# Patient Record
Sex: FEMALE | Race: ASIAN | NOT HISPANIC OR LATINO | Employment: FULL TIME | URBAN - METROPOLITAN AREA
[De-identification: names, ages, dates, MRNs, and addresses within clinical notes are randomized per-mention and may not be internally consistent; named-entity substitution may affect disease eponyms.]

---

## 2017-01-18 ENCOUNTER — TELEPHONE (OUTPATIENT)
Dept: FAMILY MEDICINE | Facility: CLINIC | Age: 52
End: 2017-01-18

## 2017-01-18 NOTE — TELEPHONE ENCOUNTER
Notes Recorded by Beatriz Lazo RN on 1/18/2017 at 10:10 AM  3rd request.  Beatriz Lazo  Pap Tracking RN    Notes Recorded by Beatriz Lazo RN on 1/11/2017 at 9:48 AM  2nd request.  Beatriz Lazo  Pap Tracking RN    Notes Recorded by Tiny Loco, RN on 1/2/2017 at 8:24 AM  Dr. Hobson    Please review and advise  51 yr old with hx of hysterectomy in 2008 (from surgical hx). Benign pathology. Cervix was also removed.    Current NIL pap/Neg HPV.  What you recommend for pap follow up for this patient?    thanks  Tiny Loco, RN, BSN  Pap Tracking

## 2017-01-19 PROBLEM — Z12.4 CERVICAL CANCER SCREENING: Status: ACTIVE | Noted: 2017-01-19

## 2017-04-11 ENCOUNTER — OFFICE VISIT (OUTPATIENT)
Dept: FAMILY MEDICINE | Facility: CLINIC | Age: 52
End: 2017-04-11
Payer: COMMERCIAL

## 2017-04-11 VITALS
SYSTOLIC BLOOD PRESSURE: 108 MMHG | DIASTOLIC BLOOD PRESSURE: 64 MMHG | HEART RATE: 72 BPM | HEIGHT: 63 IN | WEIGHT: 139 LBS | TEMPERATURE: 97.5 F | BODY MASS INDEX: 24.63 KG/M2 | OXYGEN SATURATION: 97 % | RESPIRATION RATE: 14 BRPM

## 2017-04-11 DIAGNOSIS — R10.9 FLANK PAIN: ICD-10-CM

## 2017-04-11 DIAGNOSIS — N30.00 ACUTE CYSTITIS WITHOUT HEMATURIA: Primary | ICD-10-CM

## 2017-04-11 LAB
ALBUMIN UR-MCNC: NEGATIVE MG/DL
APPEARANCE UR: CLEAR
BILIRUB UR QL STRIP: NEGATIVE
COLOR UR AUTO: YELLOW
GLUCOSE UR STRIP-MCNC: NEGATIVE MG/DL
HGB UR QL STRIP: ABNORMAL
KETONES UR STRIP-MCNC: NEGATIVE MG/DL
LEUKOCYTE ESTERASE UR QL STRIP: NEGATIVE
NITRATE UR QL: NEGATIVE
NON-SQ EPI CELLS #/AREA URNS LPF: NORMAL /LPF
PH UR STRIP: 7 PH (ref 5–7)
RBC #/AREA URNS AUTO: NORMAL /HPF (ref 0–2)
SP GR UR STRIP: 1.01 (ref 1–1.03)
URN SPEC COLLECT METH UR: ABNORMAL
UROBILINOGEN UR STRIP-ACNC: 0.2 EU/DL (ref 0.2–1)
WBC #/AREA URNS AUTO: NORMAL /HPF (ref 0–2)

## 2017-04-11 PROCEDURE — 81001 URINALYSIS AUTO W/SCOPE: CPT | Performed by: FAMILY MEDICINE

## 2017-04-11 PROCEDURE — 99214 OFFICE O/P EST MOD 30 MIN: CPT | Performed by: FAMILY MEDICINE

## 2017-04-11 RX ORDER — CIPROFLOXACIN 250 MG/1
250 TABLET, FILM COATED ORAL 2 TIMES DAILY
Qty: 6 TABLET | Refills: 0 | Status: SHIPPED | OUTPATIENT
Start: 2017-04-11 | End: 2017-04-13

## 2017-04-11 NOTE — MR AVS SNAPSHOT
After Visit Summary   4/11/2017    Leo Chou    MRN: [id number]           Patient Information     Date Of Birth          1965        Visit Information        Provider Department      4/11/2017 11:20 AM Agata Hobson MD [facility]        Today's Diagnoses     Acute cystitis without hematuria    -  1    Flank pain-LT           Care Instructions    1.  You have  a bladder infection Please increase your water intake  Do not drink cranberry juice as this does nothing different from water except cause weight gain.   We  Will notify you re the results of today's urine culture  You may need to return to be sure the infection is gone.  Oftentimes sexual intercourse causes the bacteria from the rectum to go into the bladder and cause infection.  To avoid this, please get up and go to the bathroom after intercourse and drink a large glass of water.  This allows the urine to wash out the infective bacteria and thus speed healing of an infection, and prevent one from occurring.  You may buy pyridium over the counter to help with the burning  It is a purple pill that turns the urine orange and helps with the burning           Follow-ups after your visit        Follow-up notes from your care team     Return in about 3 months (around 7/11/2017) for Routine Visit.      Who to contact     If you have questions or need follow up information about today's clinic visit or your schedule please contact [facility] directly at [phone].  Normal or non-critical lab and imaging results will be communicated to you by MyChart, letter or phone within 4 business days after the clinic has received the results. If you do not hear from us within 7 days, please contact the clinic through MyChart or phone. If you have a critical or abnormal lab result, we will notify you by phone as soon as possible.  Submit refill requests through [email or web address] or  "call your pharmacy and they will forward the refill request to us. Please allow 3 business days for your refill to be completed.          Additional Information About Your Visit        MyChart Information     [employer] [email or web address] lets you send messages to your doctor, view your test results, renew your prescriptions, schedule appointments and more. To sign up, go to [email or web address] . Click on \"Log in\" on the left side of the screen, which will take you to the Welcome page. Then click on \"Sign up Now\" on the right side of the page.     You will be asked to enter the access code listed below, as well as some personal information. Please follow the directions to create your username and password.     Your access code is: [email or web address]  Expires: 7/10/2017 12:01 PM     Your access code will  in 90 days. If you need help or a new code, please call your [place] clinic or [phone].        Care EveryWhere ID     This is your Care EveryWhere ID. This could be used by other organizations to access your [place] medical records  [id number]        Your Vitals Were     Pulse Temperature Respirations Height Last Period Pulse Oximetry    72 97.5  F (36.4  C) (Tympanic) 14 5' 2.5\" (1.588 m) (LMP Unknown) 97%    Breastfeeding? BMI (Body Mass Index)                No 25.02 kg/m2           Blood Pressure from Last 3 Encounters:   17 108/64   16 100/60   16 120/70    Weight from Last 3 Encounters:   17 139 lb (63 kg)   16 136 lb (61.7 kg)   16 133 lb (60.3 kg)              We Performed the Following     UA reflex to Microscopic and Culture     Urine Microscopic          Today's Medication Changes          These changes are accurate as of: 17 12:01 PM.  If you have any questions, ask your nurse or doctor.               Start taking these medicines.        Dose/Directions    ciprofloxacin 250 MG tablet   Commonly known as:  CIPRO   Used for:  Acute cystitis without hematuria, Flank pain "   Started by:  Agata Hobson MD        Dose:  250 mg   Take 1 tablet (250 mg) by mouth 2 times daily   Quantity:  6 tablet   Refills:  0            Where to get your medicines      These medications were sent to [place] [address] [facility] - [place], [place] - [address]  [address] [facility], [facility] [address]     Phone:  [phone]     ciprofloxacin 250 MG tablet                Primary Care Provider Office Phone # Fax #    Agata Hobson [phone]       [facility] [address]  [facility] [address]        Thank you!     Thank you for choosing [facility]  for your care. Our goal is always to provide you with excellent care. Hearing back from our patients is one way we can continue to improve our services. Please take a few minutes to complete the written survey that you may receive in the mail after your visit with us. Thank you!             Your Updated Medication List - Protect others around you: Learn how to safely use, store and throw away your medicines at www.disposemymeds.org.          This list is accurate as of: 4/11/17 12:01 PM.  Always use your most recent med list.                   Brand Name Dispense Instructions for use    aspirin 81 MG tablet     90 tablet    Take 1 tablet (81 mg) by mouth daily       ciprofloxacin 250 MG tablet    CIPRO    6 tablet    Take 1 tablet (250 mg) by mouth 2 times daily       metroNIDAZOLE 500 MG tablet    FLAGYL    28 tablet    Take 1 tablet (500 mg) by mouth 2 times daily       ONE-A-DAY 50 PLUS Tabs      Take 1 tablet by mouth daily.

## 2017-04-11 NOTE — PROGRESS NOTES
SUBJECTIVE:                                                    Leo Chou is a 51 year old female who presents to clinic today for the following health issues:    URINARY TRACT SYMPTOMS      Duration: x 1 month    Similar but not as painful in 6-16 and 12-16     Had only trace of RBCs then also as now     Description  dysuria, frequency, retention and back pain-Lt CVA    Intensity:  moderate    Accompanying signs and symptoms:  Fever/chills: no   Flank pain YES on Lt   Nausea and vomiting: YES  Vaginal symptoms: none  Abdominal/Pelvic Pain: YES    History  History of frequent UTI's: no   History of kidney stones: no   Sexually Active: YES  Possibility of pregnancy: No    Precipitating or alleviating factors: None    Therapies tried and outcome: none   Outcome: NA         Problem list and histories reviewed & adjusted, as indicated.  Additional history: as documented    Labs reviewed in EPIC    Reviewed and updated as needed this visit by clinical staff  Allergies  Meds  Problems       Reviewed and updated as needed this visit by Provider         ROS:  C: NEGATIVE for fever, chills, change in weight  I: NEGATIVE for worrisome rashes, moles or lesions  E: NEGATIVE for vision changes or irritation  E/M: NEGATIVE for ear, mouth and throat problems  R: NEGATIVE for significant cough or SOB  B: NEGATIVE for masses, tenderness or discharge  CV: NEGATIVE for chest pain, palpitations or peripheral edema  GI: NEGATIVE for nausea, abdominal pain, heartburn, or change in bowel habits   female: dysuria, frequency, hesitancy and retention  M: NEGATIVE for significant arthralgias or myalgia  N: NEGATIVE for weakness, dizziness or paresthesias  E: NEGATIVE for temperature intolerance, skin/hair changes  H: NEGATIVE for bleeding problems  P: NEGATIVE for changes in mood or affect    OBJECTIVE:                                                    /64 (BP Location: Left arm, Patient Position: Chair, Cuff Size: Adult  "Regular)  Pulse 72  Temp 97.5  F (36.4  C) (Tympanic)  Resp 14  Ht 5' 2.5\" (1.588 m)  Wt 139 lb (63 kg)  LMP  (LMP Unknown)  SpO2 97%  Breastfeeding? No  BMI 25.02 kg/m2  Body mass index is 25.02 kg/(m^2).  GENERAL: healthy, alert, in pain from Lt flank =  distress and fatigued  EYES: Eyes grossly normal to inspection, PERRL and conjunctivae and sclerae normal  RESP: lungs clear to auscultation - no rales, rhonchi or wheezes  ABDOMEN: soft, nontender, no hepatosplenomegaly, no masses and bowel sounds normal; tender suprapubic and LT CVA   MS: no gross musculoskeletal defects noted, no edema  SKIN: no suspicious lesions or rashes  NEURO: Normal strength and tone, mentation intact and speech normal  PSYCH: mentation appears normal, affect normal/bright    Diagnostic Test Results:  Results for orders placed or performed in visit on 04/11/17 (from the past 24 hour(s))   UA reflex to Microscopic and Culture   Result Value Ref Range    Color Urine Yellow     Appearance Urine Clear     Glucose Urine Negative NEG mg/dL    Bilirubin Urine Negative NEG    Ketones Urine Negative NEG mg/dL    Specific Gravity Urine 1.015 1.003 - 1.035    Blood Urine Trace (A) NEG    pH Urine 7.0 5.0 - 7.0 pH    Protein Albumin Urine Negative NEG mg/dL    Urobilinogen Urine 0.2 0.2 - 1.0 EU/dL    Nitrite Urine Negative NEG    Leukocyte Esterase Urine Negative NEG    Source Midstream Urine    Urine Microscopic   Result Value Ref Range    WBC Urine O - 2 0 - 2 /HPF    RBC Urine O - 2 0 - 2 /HPF    Squamous Epithelial /LPF Urine Few FEW /LPF        ASSESSMENT/PLAN:                                                              ICD-10-CM    1. Acute cystitis without hematuria N30.00 ciprofloxacin (CIPRO) 250 MG tablet   2. Flank pain-LT  R10.9 ciprofloxacin (CIPRO) 250 MG tablet       Patient Instructions   1.  You have  a bladder infection Please increase your water intake  Do not drink cranberry juice as this does nothing different from " water except cause weight gain.   We  Will notify you re the results of today's urine culture  You may need to return to be sure the infection is gone.  Oftentimes sexual intercourse causes the bacteria from the rectum to go into the bladder and cause infection.  To avoid this, please get up and go to the bathroom after intercourse and drink a large glass of water.  This allows the urine to wash out the infective bacteria and thus speed healing of an infection, and prevent one from occurring.  You may buy pyridium over the counter to help with the burning  It is a purple pill that turns the urine orange and helps with the burning         Agata Hobson MD  [facility]

## 2017-04-11 NOTE — PATIENT INSTRUCTIONS
1.  You have  a bladder infection Please increase your water intake  Do not drink cranberry juice as this does nothing different from water except cause weight gain.   We  Will notify you re the results of today's urine culture  You may need to return to be sure the infection is gone.  Oftentimes sexual intercourse causes the bacteria from the rectum to go into the bladder and cause infection.  To avoid this, please get up and go to the bathroom after intercourse and drink a large glass of water.  This allows the urine to wash out the infective bacteria and thus speed healing of an infection, and prevent one from occurring.  You may buy pyridium over the counter to help with the burning  It is a purple pill that turns the urine orange and helps with the burning

## 2017-04-11 NOTE — NURSING NOTE
"Chief Complaint   Patient presents with     UTI     /64 (BP Location: Left arm, Patient Position: Chair, Cuff Size: Adult Regular)  Pulse 72  Temp 97.5  F (36.4  C) (Tympanic)  Resp 14  Ht 5' 2.5\" (1.588 m)  Wt 139 lb (63 kg)  LMP  (LMP Unknown)  SpO2 97%  Breastfeeding? No  BMI 25.02 kg/m2 Estimated body mass index is 25.02 kg/(m^2) as calculated from the following:    Height as of this encounter: 5' 2.5\" (1.588 m).    Weight as of this encounter: 139 lb (63 kg).  BP completed using cuff size: regular   Marii Mccarthy CMA    There are no preventive care reminders to display for this patient.  Health Maintenance reviewed at today's visit patient asked to schedule/complete:   None, Health Maintenance up to date.    "

## 2017-04-13 ENCOUNTER — OFFICE VISIT (OUTPATIENT)
Dept: FAMILY MEDICINE | Facility: CLINIC | Age: 52
End: 2017-04-13
Payer: COMMERCIAL

## 2017-04-13 VITALS
BODY MASS INDEX: 24.8 KG/M2 | TEMPERATURE: 96.1 F | WEIGHT: 140 LBS | OXYGEN SATURATION: 99 % | DIASTOLIC BLOOD PRESSURE: 70 MMHG | HEART RATE: 85 BPM | RESPIRATION RATE: 18 BRPM | HEIGHT: 63 IN | SYSTOLIC BLOOD PRESSURE: 100 MMHG

## 2017-04-13 DIAGNOSIS — K21.9 GASTROESOPHAGEAL REFLUX DISEASE WITHOUT ESOPHAGITIS: Primary | ICD-10-CM

## 2017-04-13 DIAGNOSIS — K29.01 OTHER ACUTE GASTRITIS WITH HEMORRHAGE: ICD-10-CM

## 2017-04-13 LAB
ALBUMIN UR-MCNC: NEGATIVE MG/DL
APPEARANCE UR: CLEAR
BILIRUB UR QL STRIP: NEGATIVE
COLOR UR AUTO: YELLOW
GLUCOSE UR STRIP-MCNC: NEGATIVE MG/DL
HGB UR QL STRIP: ABNORMAL
KETONES UR STRIP-MCNC: NEGATIVE MG/DL
LEUKOCYTE ESTERASE UR QL STRIP: NEGATIVE
NITRATE UR QL: NEGATIVE
PH UR STRIP: 5.5 PH (ref 5–7)
RBC #/AREA URNS AUTO: NORMAL /HPF (ref 0–2)
SP GR UR STRIP: <=1.005 (ref 1–1.03)
URN SPEC COLLECT METH UR: ABNORMAL
UROBILINOGEN UR STRIP-ACNC: 0.2 EU/DL (ref 0.2–1)
WBC #/AREA URNS AUTO: NORMAL /HPF (ref 0–2)

## 2017-04-13 PROCEDURE — 99214 OFFICE O/P EST MOD 30 MIN: CPT | Performed by: FAMILY MEDICINE

## 2017-04-13 PROCEDURE — 81001 URINALYSIS AUTO W/SCOPE: CPT | Performed by: FAMILY MEDICINE

## 2017-04-13 RX ORDER — NAPROXEN 500 MG/1
500 TABLET ORAL 2 TIMES DAILY PRN
Qty: 30 TABLET | Refills: 1 | COMMUNITY
Start: 2016-12-13 | End: 2017-04-13 | Stop reason: SINTOL

## 2017-04-13 RX ORDER — NICOTINE POLACRILEX 4 MG/1
20 GUM, CHEWING ORAL DAILY
Qty: 30 TABLET | Refills: 0 | Status: SHIPPED | OUTPATIENT
Start: 2017-04-13 | End: 2017-05-05

## 2017-04-13 NOTE — PROGRESS NOTES
SUBJECTIVE:                                                    Leo Chou is a 51 year old female who presents to clinic today for the following health issues:    GERD & Peptic Acid Gastritis with GI Bleeding  ( black stools) 2ndary to NSAIDs since 12-16       Duration: x 1 month & worsening     Similar but not as painful in 6-16 and 12-16     Had only trace of RBCs then also last week  And  now     Description  dysuria, frequency, retention and back pain-Lt CVA    Intensity:  moderate    Accompanying signs and symptoms:  Fever/chills: no   Flank pain YES on Lt   Nausea and vomiting: YES  Vaginal symptoms: none  Abdominal/Pelvic Pain: YES    History  History of frequent UTI's: no   History of kidney stones: no   Sexually Active: YES  Possibility of pregnancy: No    Precipitating or alleviating factors: None    Pt gives different hx today of definite epigastric pain and reflux symptoms     + the hx of taking NSAID since 12-16 per  MD --none of which came out last wk     Therapies tried and outcome: none   Outcome: NA       Gastrointestinal symptoms: GERD, Gastritis , black stools       Duration: worsening over the last 1-2 mo     On Naproxen since 12-16 per  MD for Lt arm injury     Description:           REFLUX SYMPTOMS - heartburn, regurgitation of food, acid taste in mouth, nausea, problems swallowing solids and dark stools           BLEEDING - black and non-tarry stools           ABDOMINAL PAIN - epigastric area and left flank.   Pain is described as stabbing, sharp, burning, exhausting and penetrating and radiates to Lt flank .    Intensity:  severe, 8/10    Accompanying signs and symptoms:  nausea and problems swallowing -     History  Previous {similar problem: yes  RX with PPI in 2013&2015  Previous evaluation:  none    Aggravating factors: caffeine, alcohol and NSAIDs/ASA    Alleviating factors: nothing    Other Therapies tried: None        Problem list and histories reviewed & adjusted, as  indicated.  Additional history: as documented    Labs reviewed in EPIC    Reviewed and updated as needed this visit by clinical staff  Tobacco  Allergies  Meds  Med Hx  Surg Hx  Fam Hx  Soc Hx      Reviewed and updated as needed this visit by Provider         ROS:  C: NEGATIVE for fever, chills, change in weight  I: NEGATIVE for worrisome rashes, moles or lesions  E: NEGATIVE for vision changes or irritation  E/M: NEGATIVE for ear, mouth and throat problems  R: NEGATIVE for significant cough or SOB  B: NEGATIVE for masses, tenderness or discharge  CV: NEGATIVE for chest pain, palpitations or peripheral edema  GI: NEGATIVE for nausea, abdominal pain, heartburn, or change in bowel habits   female: dysuria, frequency, hesitancy and retention  M: NEGATIVE for significant arthralgias or myalgia  N: NEGATIVE for weakness, dizziness or paresthesias  E: NEGATIVE for temperature intolerance, skin/hair changes  H: NEGATIVE for bleeding problems  P: NEGATIVE for changes in mood or affect    OBJECTIVE:                                                    LMP  (LMP Unknown)  There is no height or weight on file to calculate BMI.  GENERAL: healthy, alert, in pain from Lt flank =  distress and fatigued  EYES: Eyes grossly normal to inspection, PERRL and conjunctivae and sclerae normal  RESP: lungs clear to auscultation - no rales, rhonchi or wheezes  ABDOMEN: soft, nontender, no hepatosplenomegaly, no masses and bowel sounds normal; tender suprapubic and LT CVA   MS: no gross musculoskeletal defects noted, no edema  SKIN: no suspicious lesions or rashes  NEURO: Normal strength and tone, mentation intact and speech normal  PSYCH: mentation appears normal, affect normal/bright    Diagnostic Test Results:  No results found for this or any previous visit (from the past 24 hour(s)).     ASSESSMENT/PLAN:                                                              Agata Hobson MD  [facility]  "XERXES    Additional history: as documented    Labs reviewed in EPIC    Reviewed and updated as needed this visit by clinical staff       Reviewed and updated as needed this visit by Provider         ROS:  C: NEGATIVE for fever, chills, change in weight  I: NEGATIVE for worrisome rashes, moles or lesions  E: NEGATIVE for vision changes or irritation  E/M: NEGATIVE for ear, mouth and throat problems  R: NEGATIVE for significant cough or SOB  B: NEGATIVE for masses, tenderness or discharge  CV: NEGATIVE for chest pain, palpitations or peripheral edema  GI: POSITIVE for , abdominal pain epigastric and LUQ, dyspepsia, dysphagia, heartburn or reflux, Hx gastritis, Hx GERD and nausea  : NEGATIVE for frequency, dysuria, or hematuria  M: NEGATIVE for significant arthralgias or myalgia  N: NEGATIVE for weakness, dizziness or paresthesias  E: NEGATIVE for temperature intolerance, skin/hair changes  H: NEGATIVE for bleeding problems  P: NEGATIVE for changes in mood or affect    OBJECTIVE:                                                    /70 (BP Location: Right arm, Patient Position: Chair, Cuff Size: Adult Regular)  Pulse 85  Temp 96.1  F (35.6  C) (Tympanic)  Resp 18  Ht 5' 2.5\" (1.588 m)  Wt 140 lb (63.5 kg)  LMP  (LMP Unknown)  SpO2 99%  Breastfeeding? No  BMI 25.2 kg/m2  Body mass index is 25.2 kg/(m^2).  GENERAL: alert, writhing in pain and constantly talking and complaining = distress, fatigued   EYES: Eyes grossly normal to inspection, PERRL and conjunctivae and sclerae normal  RESP: lungs clear to auscultation - no rales, rhonchi or wheezes  ABDOMEN: tenderness epigastric and LUQ, no organomegaly or masses and bowel sounds normal  MS: no gross musculoskeletal defects noted, no edema  SKIN: no suspicious lesions or rashes  NEURO: Normal strength and tone, mentation intact and speech normal  PSYCH: mentation appears normal, concentration poor, inattentive, tangential, affect normal/bright, tearful, " anxious, fatigued, speech pressured and appearance well groomed    Constantly talking and saying she needs a scan or something     Diagnostic Test Results:  Results for orders placed or performed in visit on 04/13/17   UA reflex to Microscopic and Culture   Result Value Ref Range    Color Urine Yellow     Appearance Urine Clear     Glucose Urine Negative NEG mg/dL    Bilirubin Urine Negative NEG    Ketones Urine Negative NEG mg/dL    Specific Gravity Urine <=1.005 1.003 - 1.035    Blood Urine Trace (A) NEG    pH Urine 5.5 5.0 - 7.0 pH    Protein Albumin Urine Negative NEG mg/dL    Urobilinogen Urine 0.2 0.2 - 1.0 EU/dL    Nitrite Urine Negative NEG    Leukocyte Esterase Urine Negative NEG    Source Midstream Urine    Urine Microscopic   Result Value Ref Range    WBC Urine O - 2 0 - 2 /HPF    RBC Urine O - 2 0 - 2 /HPF        ASSESSMENT/PLAN:                                                              ICD-10-CM    1. Gastroesophageal reflux disease without esophagitis K21.9    2. Other acute gastritis with hemorrhage K29.01 omeprazole 20 MG tablet       Patient Instructions     1. To help heal the  High acid causing your heartburn, we will prescribe an acid inhibitor that stops the stomach from producing acid. Please do not eat any acid, including oranges and citric acid fruits, any form of tomato, caffeine in coffee, tea and pop, no NSAIDs including aleve, advil, ibuprofen, and naprosyn NO alcohol.  Eat your last food and drink> 5 hrs prior to bedtime  And sleep sitting upright     2. No difference was  Noted by patients in a double blind study when given codeine, tylenol ( acetaminophen) or ibuprofen (all in identical pills). They felt no difference in pain relief. Since ibuprofen and the NSAIDs  causes kidney damage, esophageal damage with heartburn, and can increase the risk of esophageal and stomach cancer and ulcers,and colonic strictures. They also cause increased risk of heart attack .   I recommend that you  use tylenol(acetaminophen) for pain. Use the acetaminophen ES  Which has 500mgm/tablet You can take up to 2 tablets 4 times a day as need for pain.  If this is not enough, you can add in ibuprofen or aleve(naprosyn) with 2 glasses of fluid and some food-to protect the stomach and esophagus. Please let us know if you are continuing to take ibuprofen or aleve, as we will need to periodically check your kidney function with a blood test.    What Is Acid Reflux?    Do you have to clear your throat or cough often? Are you hoarse? Do you have difficulty swallowing? If you have these or other throat symptoms, you may have acid reflux (when stomach acid washes up and irritates your throat).  Why You Have Throat Symptoms  At both ends of the esophagus (the tube that carries food to the stomach) are the esophageal sphincters. These muscles relax to let food pass, then tighten to keep stomach acid down. When the lower esophageal sphincter (LES) doesn t tighten enough, acid can reflux from the stomach into the esophagus. This may cause heartburn. If the upper esophageal sphincter (UES) also doesn t work well, acid can travel higher and enter your throat (pharynx). In many cases, this causes throat symptoms.  Common Throat Symptoms    Frequent need to clear your throat    Feeling like you re choking    Chronic cough    Hoarseness    Trouble swallowing    Sensation of having  a lump in the throat     Sour or acid taste    Recurrent sore throat     6914-0426 [employer]. [address] [place] [address], [place] [id number]. All rights reserved. This information is not intended as a substitute for professional medical care. Always follow your healthcare professional's instructions.      ###Discussed the treatment , Dx  And future with pt in detail at least 4 times     NOTE: last wk she c/o dysuria etc and flank pain and did not reveal that she was on Naproxen since 12-16 . NOw gives hx of epigastric pain and reflux symptoms      eexplained scans would not show the problem  And that, if we can get her pain to subside, as it hopefully will in a few days with the above treatment, she may not need a UGI endoscopy     Agata Hobson MD  [facility]

## 2017-04-13 NOTE — NURSING NOTE
"Chief Complaint   Patient presents with     Abdominal Pain     /70 (BP Location: Right arm, Patient Position: Chair, Cuff Size: Adult Regular)  Pulse 85  Temp 96.1  F (35.6  C) (Tympanic)  Resp 18  Ht 5' 2.5\" (1.588 m)  Wt 140 lb (63.5 kg)  LMP  (LMP Unknown)  SpO2 99%  Breastfeeding? No  BMI 25.2 kg/m2 Estimated body mass index is 25.2 kg/(m^2) as calculated from the following:    Height as of this encounter: 5' 2.5\" (1.588 m).    Weight as of this encounter: 140 lb (63.5 kg).  BP completed using cuff size: regular   Marii Mccarthy CMA    There are no preventive care reminders to display for this patient.  Health Maintenance reviewed at today's visit patient asked to schedule/complete:   None, Health Maintenance up to date.    "

## 2017-04-13 NOTE — PATIENT INSTRUCTIONS
1. To help heal the  High acid causing your heartburn, we will prescribe an acid inhibitor that stops the stomach from producing acid. Please do not eat any acid, including oranges and citric acid fruits, any form of tomato, caffeine in coffee, tea and pop, no NSAIDs including aleve, advil, ibuprofen, and naprosyn NO alcohol.  Eat your last food and drink> 5 hrs prior to bedtime  And sleep sitting upright     2. No difference was  Noted by patients in a double blind study when given codeine, tylenol ( acetaminophen) or ibuprofen (all in identical pills). They felt no difference in pain relief. Since ibuprofen and the NSAIDs  causes kidney damage, esophageal damage with heartburn, and can increase the risk of esophageal and stomach cancer and ulcers,and colonic strictures. They also cause increased risk of heart attack .   I recommend that you use tylenol(acetaminophen) for pain. Use the acetaminophen ES  Which has 500mgm/tablet You can take up to 2 tablets 4 times a day as need for pain.  If this is not enough, you can add in ibuprofen or aleve(naprosyn) with 2 glasses of fluid and some food-to protect the stomach and esophagus. Please let us know if you are continuing to take ibuprofen or aleve, as we will need to periodically check your kidney function with a blood test.    What Is Acid Reflux?    Do you have to clear your throat or cough often? Are you hoarse? Do you have difficulty swallowing? If you have these or other throat symptoms, you may have acid reflux (when stomach acid washes up and irritates your throat).  Why You Have Throat Symptoms  At both ends of the esophagus (the tube that carries food to the stomach) are the esophageal sphincters. These muscles relax to let food pass, then tighten to keep stomach acid down. When the lower esophageal sphincter (LES) doesn t tighten enough, acid can reflux from the stomach into the esophagus. This may cause heartburn. If the upper esophageal sphincter (UES)  also doesn t work well, acid can travel higher and enter your throat (pharynx). In many cases, this causes throat symptoms.  Common Throat Symptoms    Frequent need to clear your throat    Feeling like you re choking    Chronic cough    Hoarseness    Trouble swallowing    Sensation of having  a lump in the throat     Sour or acid taste    Recurrent sore throat     8822-1061 [employer]. [address] [place] [address]. All rights reserved. This information is not intended as a substitute for professional medical care. Always follow your healthcare professional's instructions.      ###Discussed the treatment , Dx  And future with pt in detail at least 4 times     NOTE: last wk she c/o dysuria etc and flank pain and did not reveal that she was on Naproxen since 12-16 . NOw gives hx of epigastric pain and reflux symptoms

## 2017-04-13 NOTE — MR AVS SNAPSHOT
After Visit Summary   4/13/2017    Leo Chou    MRN: [id number]           Patient Information     Date Of Birth          1965        Visit Information        Provider Department      4/13/2017 10:20 AM Agata Hobson MD [facility]        Today's Diagnoses     Gastroesophageal reflux disease without esophagitis    -  1    Other acute gastritis with hemorrhage          Care Instructions    1. To help heal the  High acid causing your heartburn, we will prescribe an acid inhibitor that stops the stomach from producing acid. Please do not eat any acid, including oranges and citric acid fruits, any form of tomato, caffeine in coffee, tea and pop, no NSAIDs including aleve, advil, ibuprofen, and naprosyn NO alcohol.  Eat your last food and drink> 5 hrs prior to bedtime  And sleep sitting upright     2. No difference was  Noted by patients in a double blind study when given codeine, tylenol ( acetaminophen) or ibuprofen (all in identical pills). They felt no difference in pain relief. Since ibuprofen and the NSAIDs  causes kidney damage, esophageal damage with heartburn, and can increase the risk of esophageal and stomach cancer and ulcers,and colonic strictures. They also cause increased risk of heart attack .   I recommend that you use tylenol(acetaminophen) for pain. Use the acetaminophen ES  Which has 500mgm/tablet You can take up to 2 tablets 4 times a day as need for pain.  If this is not enough, you can add in ibuprofen or aleve(naprosyn) with 2 glasses of fluid and some food-to protect the stomach and esophagus. Please let us know if you are continuing to take ibuprofen or aleve, as we will need to periodically check your kidney function with a blood test.          Follow-ups after your visit        Follow-up notes from your care team     Return in about 5 days (around 4/18/2017) for f/u gerd.      Who to contact     If you have questions or  "need follow up information about today's clinic visit or your schedule please contact [facility] directly at [phone].  Normal or non-critical lab and imaging results will be communicated to you by MyChart, letter or phone within 4 business days after the clinic has received the results. If you do not hear from us within 7 days, please contact the clinic through MyChart or phone. If you have a critical or abnormal lab result, we will notify you by phone as soon as possible.  Submit refill requests through InnoPad or call your pharmacy and they will forward the refill request to us. Please allow 3 business days for your refill to be completed.          Additional Information About Your Visit        Tins.[email or web address] Information     InnoPad lets you send messages to your doctor, view your test results, renew your prescriptions, schedule appointments and more. To sign up, go to [email or web address] . Click on \"Log in\" on the left side of the screen, which will take you to the Welcome page. Then click on \"Sign up Now\" on the right side of the page.     You will be asked to enter the access code listed below, as well as some personal information. Please follow the directions to create your username and password.     Your access code is: [email or web address]  Expires: 7/10/2017 12:01 PM     Your access code will  in 90 days. If you need help or a new code, please call your [facility] clinic or [phone].        Care EveryWhere ID     This is your Care EveryWhere ID. This could be used by other organizations to access your [facility] medical records  [id number]        Your Vitals Were     Pulse Temperature Respirations Height Last Period Pulse Oximetry    85 96.1  F (35.6  C) (Tympanic) 18 5' 2.5\" (1.588 m) (LMP Unknown) 99%    Breastfeeding? BMI (Body Mass Index)                No 25.2 kg/m2           Blood Pressure from Last 3 Encounters:   17 100/70   17 108/64   16 100/60    " Weight from Last 3 Encounters:   04/13/17 140 lb (63.5 kg)   04/11/17 139 lb (63 kg)   12/22/16 136 lb (61.7 kg)              We Performed the Following     UA reflex to Microscopic and Culture     Urine Microscopic          Today's Medication Changes          These changes are accurate as of: 4/13/17 11:02 AM.  If you have any questions, ask your nurse or doctor.               Stop taking these medicines if you haven't already. Please contact your care team if you have questions.     NAPROSYN 500 MG tablet   Generic drug:  naproxen   Stopped by:  Agata Hobson MD                    Primary Care Provider Office Phone # Fax #    Agata Hobson [phone]       [facility] [address] [facility] [address]        Thank you!     Thank you for choosing [facility]  for your care. Our goal is always to provide you with excellent care. Hearing back from our patients is one way we can continue to improve our services. Please take a few minutes to complete the written survey that you may receive in the mail after your visit with us. Thank you!             Your Updated Medication List - Protect others around you: Learn how to safely use, store and throw away your medicines at www.disposemymeds.org.          This list is accurate as of: 4/13/17 11:02 AM.  Always use your most recent med list.                   Brand Name Dispense Instructions for use    aspirin 81 MG tablet     90 tablet    Take 1 tablet (81 mg) by mouth daily       ONE-A-DAY 50 PLUS Tabs      Take 1 tablet by mouth daily.

## 2017-04-18 ENCOUNTER — OFFICE VISIT (OUTPATIENT)
Dept: FAMILY MEDICINE | Facility: CLINIC | Age: 52
End: 2017-04-18
Payer: COMMERCIAL

## 2017-04-18 VITALS
TEMPERATURE: 98.7 F | OXYGEN SATURATION: 98 % | BODY MASS INDEX: 25.95 KG/M2 | SYSTOLIC BLOOD PRESSURE: 120 MMHG | RESPIRATION RATE: 12 BRPM | DIASTOLIC BLOOD PRESSURE: 60 MMHG | WEIGHT: 141 LBS | HEIGHT: 62 IN | HEART RATE: 68 BPM

## 2017-04-18 DIAGNOSIS — K29.01 OTHER ACUTE GASTRITIS WITH HEMORRHAGE: ICD-10-CM

## 2017-04-18 DIAGNOSIS — K21.00 GASTROESOPHAGEAL REFLUX DISEASE WITH ESOPHAGITIS: Primary | ICD-10-CM

## 2017-04-18 PROCEDURE — 99213 OFFICE O/P EST LOW 20 MIN: CPT | Performed by: FAMILY MEDICINE

## 2017-04-18 NOTE — PATIENT INSTRUCTIONS
1. See [facility]   [phone] re the gastritis and heartburn     2. Continue all the same :    A. Omeprazole  B. Low acid diet:  To help heal the  High acid causing your heartburn, we will prescribe an acid inhibitor that stops the stomach from producing acid. Please do not eat any acid, including oranges and citric acid fruits, any form of tomato, caffeine in coffee, tea and pop, no NSAIDs including aleve, advil, ibuprofen, and naprosyn NO alcohol.  Eat your last food and drink> 5 hrs prior to bedtime  And sleep sitting upright   C.liquid antacid as often as need  Especially after throwing up   D.no food for 6 hrs before bed   E. Sleep sitting up on pillows

## 2017-04-18 NOTE — MR AVS SNAPSHOT
After Visit Summary   4/18/2017    Leo Chou    MRN: [id number]           Patient Information     Date Of Birth          1965        Visit Information        Provider Department      4/18/2017 2:20 PM Agata Hobson MD [facility]        Today's Diagnoses     Gastroesophageal reflux disease with esophagitis since 2012 and worse on NSAID    -  1    Other acute gastritis with hemorrhage ie black stools  NSAID induced           Care Instructions    1. See [facility]   [phone] re the gastritis and heartburn     2. Continue all the same :    A. Omeprazole  B. Low acid diet:  To help heal the  High acid causing your heartburn, we will prescribe an acid inhibitor that stops the stomach from producing acid. Please do not eat any acid, including oranges and citric acid fruits, any form of tomato, caffeine in coffee, tea and pop, no NSAIDs including aleve, advil, ibuprofen, and naprosyn NO alcohol.  Eat your last food and drink> 5 hrs prior to bedtime  And sleep sitting upright   C.liquid antacid as often as need  Especially after throwing up   D.no food for 6 hrs before bed   E. Sleep sitting up on pillows         Follow-ups after your visit        Additional Services     GASTROENTEROLOGY ADULT REF CONSULT ONLY       Preferred Location: [facility] [phone]      Please be aware that coverage of these services is subject to the terms and limitations of your health insurance plan.  Call member services at your health plan with any benefit or coverage questions.  Any procedures must be performed at a [facility] facility OR coordinated by your clinic's referral office.    Please bring the following with you to your appointment:    (1) Any X-Rays, CTs or MRIs which have been performed.  Contact the facility where they were done to arrange for  prior to your scheduled appointment.    (2) List of current medications   (3) This referral request   (4) Any  "documents/labs given to you for this referral                  Future tests that were ordered for you today     Open Future Orders        Priority Expected Expires Ordered    H Pylori antigen, stool Routine  2017            Who to contact     If you have questions or need follow up information about today's clinic visit or your schedule please contact [facility] directly at [phone].  Normal or non-critical lab and imaging results will be communicated to you by MyChart, letter or phone within 4 business days after the clinic has received the results. If you do not hear from us within 7 days, please contact the clinic through Anagranhart or phone. If you have a critical or abnormal lab result, we will notify you by phone as soon as possible.  Submit refill requests through Ubookoo or call your pharmacy and they will forward the refill request to us. Please allow 3 business days for your refill to be completed.          Additional Information About Your Visit        AnagranharIndianStage Information     Ubookoo lets you send messages to your doctor, view your test results, renew your prescriptions, schedule appointments and more. To sign up, go to [email or web address] . Click on \"Log in\" on the left side of the screen, which will take you to the Welcome page. Then click on \"Sign up Now\" on the right side of the page.     You will be asked to enter the access code listed below, as well as some personal information. Please follow the directions to create your username and password.     Your access code is: [email or web address]  Expires: 7/10/2017 12:01 PM     Your access code will  in 90 days. If you need help or a new code, please call your [facility] or [phone].        Care EveryWhere ID     This is your Care EveryWhere ID. This could be used by other organizations to access your [facility] medical records  [id number]        Your Vitals Were     Pulse Temperature Respirations " "Height Last Period Pulse Oximetry    68 98.7  F (37.1  C) (Tympanic) 12 5' 2.2\" (1.58 m) (LMP Unknown) 98%    Breastfeeding? BMI (Body Mass Index)                No 25.62 kg/m2           Blood Pressure from Last 3 Encounters:   04/18/17 120/60   04/13/17 100/70   04/11/17 108/64    Weight from Last 3 Encounters:   04/18/17 141 lb (64 kg)   04/13/17 140 lb (63.5 kg)   04/11/17 139 lb (63 kg)              We Performed the Following     GASTROENTEROLOGY ADULT REF CONSULT ONLY        Primary Care Provider Office Phone # Fax #    Agata Hobson [phone]       [facility] [address] [facility] [address]        Thank you!     [facility]. Our goal is always to provide you with excellent care. Hearing back from our patients is one way we can continue to improve our services. Please take a few minutes to complete the written survey that you may receive in the mail after your visit with us. Thank you!             Your Updated Medication List - Protect others around you: Learn how to safely use, store and throw away your medicines at www.disposemymeds.org.          This list is accurate as of: 4/18/17  3:15 PM.  Always use your most recent med list.                   Brand Name Dispense Instructions for use    aspirin 81 MG tablet     90 tablet    Take 1 tablet (81 mg) by mouth daily       omeprazole 20 MG tablet     30 tablet    Take 1 tablet (20 mg) by mouth daily Take 30-60 minutes before a meal.       ONE-A-DAY 50 PLUS Tabs      Take 1 tablet by mouth daily.         "

## 2017-04-18 NOTE — PROGRESS NOTES
SUBJECTIVE:                                                    Leo Chou is a 51 year old female who presents to clinic today for the following health issues:    GERD & Peptic Acid Gastritis with GI Bleeding  ( black stools) 2ndary to NSAIDs since 12-16       Duration: x 1 month & worsening     Similar but not as painful in 6-16 and 12-16     Had only trace of RBCs then also last week  And  now     Description  dysuria, frequency, retention and back pain-Lt CVA    Intensity:  moderate    Accompanying signs and symptoms:  Fever/chills: no   Flank pain YES on Lt   Nausea and vomiting: YES  Vaginal symptoms: none  Abdominal/Pelvic Pain: YES    History  History of frequent UTI's: no   History of kidney stones: no   Sexually Active: YES  Possibility of pregnancy: No    Precipitating or alleviating factors: None    Pt gives different hx today of definite epigastric pain and reflux symptoms     + the hx of taking NSAID= naproxen bid  since 12-16 per  MD --none of which came out last wk     Therapies tried and outcome: none   Outcome: NA       Gastrointestinal symptoms: GERD, Gastritis , black stools       Duration: worsening over the last 1-2 mo     On Naproxen since 12-16 per  MD for Lt arm injury     Description:           REFLUX SYMPTOMS - heartburn, regurgitation of food, acid taste in mouth, nausea, problems swallowing solids and dark stools           BLEEDING - black and non-tarry stools- not as often            ABDOMINAL PAIN - epigastric area and left flank.   Pain is described as stabbing, sharp, burning, exhausting and penetrating and radiates to Lt flank .    Intensity:  severe, 8/10 now down to 5/10-     Accompanying signs and symptoms:  nausea and problems swallowing -     History  Previous {similar problem: yes  RX with PPI in 2013&2015  Previous evaluation:  none    Aggravating factors: caffeine, alcohol and NSAIDs/ASA    Alleviating factors: nothing    Other Therapies tried: start PPI  "4-13-17  And liq antacid prn q hr and no acid diet     Still coffee q am         Problem list and histories reviewed & adjusted, as indicated.  Additional history: as documented    Labs reviewed in EPIC    Reviewed and updated as needed this visit by clinical staff  Tobacco  Allergies  Meds  Problems  Med Hx  Surg Hx  Fam Hx  Soc Hx        Reviewed and updated as needed this visit by Provider  Allergies  Meds  Problems         ROS:  C: NEGATIVE for fever, chills, change in weight  I: NEGATIVE for worrisome rashes, moles or lesions  E: NEGATIVE for vision changes or irritation  E/M: NEGATIVE for ear, mouth and throat problems  R: NEGATIVE for significant cough or SOB  B: NEGATIVE for masses, tenderness or discharge  CV: NEGATIVE for chest pain, palpitations or peripheral edema  GI: NEGATIVE for nausea, abdominal pain, heartburn, or change in bowel habits   female: dysuria, frequency, hesitancy and retention  M: NEGATIVE for significant arthralgias or myalgia  N: NEGATIVE for weakness, dizziness or paresthesias  E: NEGATIVE for temperature intolerance, skin/hair changes  H: NEGATIVE for bleeding problems  P: NEGATIVE for changes in mood or affect    OBJECTIVE:                                                    /60 (BP Location: Left arm, Patient Position: Chair, Cuff Size: Adult Regular)  Pulse 68  Temp 98.7  F (37.1  C) (Tympanic)  Resp 12  Ht 5' 2.2\" (1.58 m)  Wt 141 lb (64 kg)  LMP  (LMP Unknown)  SpO2 98%  Breastfeeding? No  BMI 25.62 kg/m2  Body mass index is 25.62 kg/(m^2).  GENERAL: healthy, alert, in pain from Lt flank =  distress and fatigued  EYES: Eyes grossly normal to inspection, PERRL and conjunctivae and sclerae normal  RESP: lungs clear to auscultation - no rales, rhonchi or wheezes  ABDOMEN: soft, nontender, no hepatosplenomegaly, no masses and bowel sounds normal; tender suprapubic and LT CVA   MS: no gross musculoskeletal defects noted, no edema  SKIN: no suspicious " "lesions or rashes  NEURO: Normal strength and tone, mentation intact and speech normal  PSYCH: mentation appears normal, affect normal/bright    Diagnostic Test Results:  No results found for this or any previous visit (from the past 24 hour(s)).     ASSESSMENT/PLAN:                                                              Agata Hobson MD  [facility]    Additional history: as documented    Labs reviewed in EPIC    Reviewed and updated as needed this visit by clinical staff  Tobacco  Allergies  Meds  Problems  Med Hx  Surg Hx  Fam Hx  Soc Hx        Reviewed and updated as needed this visit by Provider  Allergies  Meds  Problems         ROS:  C: NEGATIVE for fever, chills, change in weight  I: NEGATIVE for worrisome rashes, moles or lesions  E: NEGATIVE for vision changes or irritation  E/M: NEGATIVE for ear, mouth and throat problems  R: NEGATIVE for significant cough or SOB  B: NEGATIVE for masses, tenderness or discharge  CV: NEGATIVE for chest pain, palpitations or peripheral edema  GI: POSITIVE for , abdominal pain epigastric and LUQ, dyspepsia, dysphagia, heartburn or reflux, Hx gastritis, Hx GERD and nausea  : NEGATIVE for frequency, dysuria, or hematuria  M: NEGATIVE for significant arthralgias or myalgia  N: NEGATIVE for weakness, dizziness or paresthesias  E: NEGATIVE for temperature intolerance, skin/hair changes  H: NEGATIVE for bleeding problems  P: NEGATIVE for changes in mood or affect    OBJECTIVE:                                                    /60 (BP Location: Left arm, Patient Position: Chair, Cuff Size: Adult Regular)  Pulse 68  Temp 98.7  F (37.1  C) (Tympanic)  Resp 12  Ht 5' 2.2\" (1.58 m)  Wt 141 lb (64 kg)  LMP  (LMP Unknown)  SpO2 98%  Breastfeeding? No  BMI 25.62 kg/m2  Body mass index is 25.62 kg/(m^2).  GENERAL: alert, writhing in pain and constantly talking and complaining = distress, fatigued   EYES: Eyes grossly normal " to inspection, PERRL and conjunctivae and sclerae normal  RESP: lungs clear to auscultation - no rales, rhonchi or wheezes  ABDOMEN: tenderness epigastric and LUQ, no organomegaly or masses and bowel sounds normal  MS: no gross musculoskeletal defects noted, no edema  SKIN: no suspicious lesions or rashes  NEURO: Normal strength and tone, mentation intact and speech normal  PSYCH: mentation appears normal, concentration poor, inattentive, tangential, affect normal/bright, tearful, anxious, fatigued, speech pressured and appearance well groomed    Constantly talking and saying she needs a scan or something     Diagnostic Test Results:  Results for orders placed or performed in visit on 04/13/17   UA reflex to Microscopic and Culture   Result Value Ref Range    Color Urine Yellow     Appearance Urine Clear     Glucose Urine Negative NEG mg/dL    Bilirubin Urine Negative NEG    Ketones Urine Negative NEG mg/dL    Specific Gravity Urine <=1.005 1.003 - 1.035    Blood Urine Trace (A) NEG    pH Urine 5.5 5.0 - 7.0 pH    Protein Albumin Urine Negative NEG mg/dL    Urobilinogen Urine 0.2 0.2 - 1.0 EU/dL    Nitrite Urine Negative NEG    Leukocyte Esterase Urine Negative NEG    Source Midstream Urine    Urine Microscopic   Result Value Ref Range    WBC Urine O - 2 0 - 2 /HPF    RBC Urine O - 2 0 - 2 /HPF        ASSESSMENT/PLAN:                                                            No diagnosis found.    There are no Patient Instructions on file for this visit.  eexplained scans would not show the problem  And that, if we can get her pain to subside, as it hopefully will in a few days with the above treatment, she may not need a UGI endoscopy     Agata Hobson MD  [facility]    Labs reviewed in EPIC    Reviewed and updated as needed this visit by clinical staff  Tobacco  Allergies  Meds  Problems  Med Hx  Surg Hx  Fam Hx  Soc Hx        Reviewed and updated as needed this visit  "by Provider         ROS:  C: NEGATIVE for fever, chills, change in weight  I: NEGATIVE for worrisome rashes, moles or lesions  E: NEGATIVE for vision changes or irritation  E/M: NEGATIVE for ear, mouth and throat problems  R: NEGATIVE for significant cough or SOB  B: NEGATIVE for masses, tenderness or discharge  CV: NEGATIVE for chest pain, palpitations or peripheral edema  GI: POSITIVE for , abdominal pain epigastric, dyspepsia, heartburn or reflux, Hx gastritis, Hx GERD, nausea and vomiting  : NEGATIVE for frequency, dysuria, or hematuria  M: NEGATIVE for significant arthralgias or myalgia  N: NEGATIVE for weakness, dizziness or paresthesias  E: NEGATIVE for temperature intolerance, skin/hair changes  H: NEGATIVE for bleeding problems  P: NEGATIVE for changes in mood or affect    OBJECTIVE:                                                    /60 (BP Location: Left arm, Patient Position: Chair, Cuff Size: Adult Regular)  Pulse 68  Temp 98.7  F (37.1  C) (Tympanic)  Resp 12  Ht 5' 2.2\" (1.58 m)  Wt 141 lb (64 kg)  LMP  (LMP Unknown)  SpO2 98%  Breastfeeding? No  BMI 25.62 kg/m2  Body mass index is 25.62 kg/(m^2).  GENERAL: healthy, alert and no distress  EYES: Eyes grossly normal to inspection, PERRL and conjunctivae and sclerae normal  RESP: lungs clear to auscultation - no rales, rhonchi or wheezes  CV: regular rate and rhythm, normal S1 S2, no S3 or S4, no murmur, click or rub, no peripheral edema and peripheral pulses strong  ABDOMEN: soft, nontender, no hepatosplenomegaly, no masses and bowel sounds normal  MS: no gross musculoskeletal defects noted, no edema  SKIN: no suspicious lesions or rashes  NEURO: Normal strength and tone, mentation intact and speech normal  PSYCH: mentation appears normal, affect normal/bright    Diagnostic Test Results:  none      ASSESSMENT/PLAN:                                                              ICD-10-CM    1. Gastroesophageal reflux disease with " esophagitis since 2012 and worse on NSAID K21.0 GASTROENTEROLOGY ADULT REF CONSULT ONLY     H Pylori antigen, stool   2. Other acute gastritis with hemorrhage ie black stools  NSAID induced  K29.01 GASTROENTEROLOGY ADULT REF CONSULT ONLY     H Pylori antigen, stool       Patient Instructions   1. See [facility]   [phone] re the gastritis and heartburn     2. Continue all the same :    A. Omeprazole  B. Low acid diet:  To help heal the  High acid causing your heartburn, we will prescribe an acid inhibitor that stops the stomach from producing acid. Please do not eat any acid, including oranges and citric acid fruits, any form of tomato, caffeine in coffee, tea and pop, no NSAIDs including aleve, advil, ibuprofen, and naprosyn NO alcohol.  Eat your last food and drink> 5 hrs prior to bedtime  And sleep sitting upright   C.liquid antacid as often as need  Especially after throwing up   D.no food for 6 hrs before bed   E. Sleep sitting up on pillows     Discussed all with pt  And the patient expresses understanding  Pt much less in pain and more comfortable   I  Explained the treatment and the reason for it   She conts to demand abx for her bladder infection so showed her again the 2 neg U/As and explained   She has a hx of back pain like this with her prior GERD in 2012     Agata Hobson MD  [facility]

## 2017-04-19 DIAGNOSIS — K29.01 OTHER ACUTE GASTRITIS WITH HEMORRHAGE: ICD-10-CM

## 2017-04-19 DIAGNOSIS — K21.00 GASTROESOPHAGEAL REFLUX DISEASE WITH ESOPHAGITIS: ICD-10-CM

## 2017-04-19 PROCEDURE — 87338 HPYLORI STOOL AG IA: CPT | Performed by: FAMILY MEDICINE

## 2017-04-20 ENCOUNTER — TELEPHONE (OUTPATIENT)
Dept: FAMILY MEDICINE | Facility: CLINIC | Age: 52
End: 2017-04-20

## 2017-04-20 DIAGNOSIS — R76.8 HELICOBACTER PYLORI ANTIBODY POSITIVE: Primary | ICD-10-CM

## 2017-04-20 LAB
H PYLORI AG STL QL IA: ABNORMAL
MICRO REPORT STATUS: ABNORMAL
SPECIMEN SOURCE: ABNORMAL

## 2017-04-20 RX ORDER — CLARITHROMYCIN 500 MG
500 TABLET ORAL 2 TIMES DAILY
Qty: 28 TABLET | Refills: 0 | Status: SHIPPED | OUTPATIENT
Start: 2017-04-20 | End: 2017-05-05

## 2017-04-20 RX ORDER — AMOXICILLIN 500 MG/1
1000 CAPSULE ORAL 2 TIMES DAILY
Qty: 28 CAPSULE | Refills: 0 | Status: SHIPPED | OUTPATIENT
Start: 2017-04-20 | End: 2017-05-05

## 2017-04-20 NOTE — TELEPHONE ENCOUNTER
I told her that she needs to see me by early next week to be sure she's taking the meds and how it is going     Get her an appt     Yes, should also see GEE curtis

## 2017-04-20 NOTE — TELEPHONE ENCOUNTER
1.  Pt feels better     2. Still needs to make appt with [facility]     3. + HPylorii  So started the ;meds ie bid biaxin and amox for 14 days     Will take omeprazole  Bid for 14 days then back to once a day     Please call pt and reiterate the above \\especially : tell her to stay on the meds and not stop them even if side effects, as we cannot restart them once she stops them and there are no 2nd chances to eradicate the H Pylorii and ease her stomach pain/ illness

## 2017-04-20 NOTE — TELEPHONE ENCOUNTER
Called patient with message below. Pt asked if follow up appt needed with provider at clinic. Advised she follow up with [facility].   Please advice if provider also recommend she return to clinic. No further action needed if agree instructions with above.

## 2017-04-27 ENCOUNTER — TRANSFERRED RECORDS (OUTPATIENT)
Dept: HEALTH INFORMATION MANAGEMENT | Facility: CLINIC | Age: 52
End: 2017-04-27

## 2017-04-27 DIAGNOSIS — R76.8 HELICOBACTER PYLORI ANTIBODY POSITIVE: ICD-10-CM

## 2017-05-01 NOTE — TELEPHONE ENCOUNTER
amoxicilin  -dx= pos Hpylori   Last Written Prescription Date:  4-20-17  Last Fill Quantity: 28,   # refills: 0  Last Office Visit with [facility], [facility] or [facility] prescribing provider: 4-18-17  Future Office visit:    Next 5 appointments (look out 90 days)     May 05, 2017 10:20 AM CDT   SHORT with Agata Hobson MD   [facility]    [address] [place] [id number]   [phone]                   Routing refill request to provider for review/approval because:  Drug not on the [facility], [facility] or [facility] refill protocol or controlled substance

## 2017-05-02 NOTE — TELEPHONE ENCOUNTER
Pt was to take the H Pylorii meds for 14 days , starting on 4-20-17     She must be confused    Was supposed to come in a day or so after starting it to reaffirm and be sure she was taking it

## 2017-05-05 ENCOUNTER — OFFICE VISIT (OUTPATIENT)
Dept: FAMILY MEDICINE | Facility: CLINIC | Age: 52
End: 2017-05-05
Payer: COMMERCIAL

## 2017-05-05 VITALS
HEIGHT: 62 IN | WEIGHT: 139 LBS | SYSTOLIC BLOOD PRESSURE: 112 MMHG | BODY MASS INDEX: 25.58 KG/M2 | OXYGEN SATURATION: 99 % | RESPIRATION RATE: 14 BRPM | TEMPERATURE: 97.2 F | HEART RATE: 61 BPM | DIASTOLIC BLOOD PRESSURE: 78 MMHG

## 2017-05-05 DIAGNOSIS — A04.8 BACTERIAL INFECTION DUE TO HELICOBACTER PYLORI: ICD-10-CM

## 2017-05-05 DIAGNOSIS — K29.01 OTHER ACUTE GASTRITIS WITH HEMORRHAGE: ICD-10-CM

## 2017-05-05 DIAGNOSIS — E66.3 OVERWEIGHT (BMI 25.0-29.9): ICD-10-CM

## 2017-05-05 DIAGNOSIS — K21.00 GASTROESOPHAGEAL REFLUX DISEASE WITH ESOPHAGITIS: ICD-10-CM

## 2017-05-05 DIAGNOSIS — R13.10 DYSPHAGIA, UNSPECIFIED TYPE: Primary | ICD-10-CM

## 2017-05-05 PROCEDURE — 99213 OFFICE O/P EST LOW 20 MIN: CPT | Performed by: FAMILY MEDICINE

## 2017-05-05 RX ORDER — NICOTINE POLACRILEX 4 MG/1
20 GUM, CHEWING ORAL DAILY
Qty: 90 TABLET | Refills: 0 | Status: SHIPPED | OUTPATIENT
Start: 2017-05-05 | End: 2018-11-23

## 2017-05-05 NOTE — PROGRESS NOTES
SUBJECTIVE:                                                    Leo Chou is a 51 year old female who presents to clinic today for the following health issues:    GERD with bleeding gastritis/ H Pylorii      Duration: x 1 month & now better on low acid diet, PPI and the H Pylorii regime  Tho she had 14 d of all but only 7 d of amox     Similar but not as painful in 6-16 and 12-16     Had only trace of RBCs then also last week  And  now     Description  dysuria, frequency, retention and back pain-Lt CVA    Intensity:  moderate    Accompanying signs and symptoms:  Fever/chills: no   Flank pain YES on Lt   Nausea and vomiting: YES  Vaginal symptoms: none  Abdominal/Pelvic Pain: YES    History  History of frequent UTI's: no   History of kidney stones: no   Sexually Active: YES  Possibility of pregnancy: No    Precipitating or alleviating factors: None    Patient gives different hx today of definite epigastric pain and reflux symptoms     The hx of taking NSAID= naproxen bid  since 12-16 per  MD --none of which came out last wk     Therapies tried and outcome: better on the above     Gastrointestinal Symptoms-as above       Duration: worsening over the last 1-2 mo on the NSAID but now better     On Naproxen since 12-16 per  MD for Lt arm injury     Description:           REFLUX SYMPTOMS - heartburn, regurgitation of food, acid taste in mouth, nausea, problems swallowing solids and dark stools           BLEEDING - black and non-tarry stools- not as often            ABDOMINAL PAIN - epigastric area and left flank.   Pain is described as stabbing, sharp, burning, exhausting and penetrating and radiates to Lt flank .    Intensity:  severe, 8/10 now down to 5/10-     Accompanying signs and symptoms:  nausea and problems swallowing -     History  Previous {similar problem: yes  RX with PPI in 2013&2015  Previous evaluation:  none    Aggravating factors: caffeine, alcohol and NSAIDs/ASA    Alleviating factors:  "nothing    Other Therapies tried: start PPI 4-13-17  And liq antacid prn q hr and no acid diet     Still coffee q am       DYsphagia       Duration: 2-4 yrs     Description (location/character/radiation): feels things stick suprasternal     Intensity:  Mild ie no choking     Accompanying signs and symptoms: above now all resolved     History (similar episodes/previous evaluation): None    Precipitating or alleviating factors: None    Therapies tried and outcome: above and will stay on the q d PPI for at least 6 weeks and sees [facility] for UGI endoscopy in a wk or so        Problem list and histories reviewed & adjusted, as indicated.  Additional history: as documented    Labs reviewed in EPIC    Reviewed and updated as needed this visit by clinical staff  Allergies  Meds  Problems       Reviewed and updated as needed this visit by Provider         ROS:  C: NEGATIVE for fever, chills, change in weight  I: NEGATIVE for worrisome rashes, moles or lesions  E: NEGATIVE for vision changes or irritation  E/M: NEGATIVE for ear, mouth and throat problems  R: NEGATIVE for significant cough or SOB  B: NEGATIVE for masses, tenderness or discharge  CV: NEGATIVE for chest pain, palpitations or peripheral edema  GI: NEGATIVE for nausea, abdominal pain, heartburn, or change in bowel habits  : NEGATIVE for frequency, dysuria, or hematuria  M: NEGATIVE for significant arthralgias or myalgia  N: NEGATIVE for weakness, dizziness or paresthesias  E: NEGATIVE for temperature intolerance, skin/hair changes  H: NEGATIVE for bleeding problems  P: NEGATIVE for changes in mood or affect    OBJECTIVE:                                                    /78  Pulse 61  Temp 97.2  F (36.2  C) (Tympanic)  Resp 14  Ht 5' 2\" (1.575 m)  Wt 139 lb (63 kg)  LMP  (LMP Unknown)  SpO2 99%  Breastfeeding? No  BMI 25.42 kg/m2  Body mass index is 25.42 kg/(m^2).  GENERAL: healthy, alert and no distress  EYES: Eyes grossly normal to " inspection, PERRL and conjunctivae and sclerae normal  RESP: lungs clear to auscultation - no rales, rhonchi or wheezes  CV: regular rate and rhythm, normal S1 S2, no S3 or S4, no murmur, click or rub, no peripheral edema and peripheral pulses strong  MS: no gross musculoskeletal defects noted, no edema  SKIN: no suspicious lesions or rashes  NEURO: Normal strength and tone, mentation intact and speech normal  PSYCH: mentation appears normal, affect normal/bright    Diagnostic Test Results:  none      ASSESSMENT/PLAN:                                                              ICD-10-CM    1. Dysphagia, unspecified type R13.10    2. Other acute gastritis with hemorrhage K29.01 omeprazole 20 MG tablet   3. Gastroesophageal reflux disease with esophagitis K21.0    4. Overweight (BMI 25.0-29.9) E66.3        Patient Instructions   1. No difference was  Noted by patients in a double blind study when given codeine, tylenol ( acetaminophen) or ibuprofen (all in identical pills). They felt no difference in pain relief. Since ibuprofen and the NSAIDs  causes kidney damage, esophageal damage with heartburn, and can increase the risk of esophageal and stomach cancer and ulcers,and colonic strictures. They also cause increased risk of heart attack .   I recommend that you use tylenol(acetaminophen) for pain. Use the acetaminophen ES  Which has 500mgm/tablet You can take up to 2 tablets 4 times a day as need for pain.  If this is not enough, you can add in ibuprofen or aleve(naprosyn) with 2 glasses of fluid and some food-to protect the stomach and esophagus. Please let us know if you are continuing to take ibuprofen or aleve, as we will need to periodically check your kidney function with a blood test.    2. You will get a f/u upper endoscope from [facility] for your trouble swallowing  As your gerd  Or heart burn is better    But continue the omeprazole at one a day  Till you see GI and ask them   It takes 6 weeks to heal this  burn --and continue the diet     3. To help heal the  High acid causing your heartburn, we will prescribe an acid inhibitor that stops the stomach from producing acid. Please do not eat any acid, including oranges and citric acid fruits, any form of tomato, caffeine in coffee, tea ####and pop, no NSAIDs including aleve, advil, ibuprofen, and naprosyn NO alcohol.  Eat your last food and drink> 5 hrs prior to bedtime  And sleep sitting upright     4.  Weight Loss Tips  1. Do not eat after 6 hrs before your expected bedtime  2. Have your heaviest meal for breakfast, a slightly lighter meal at lunch and a snack 6 hrs before bed  3. No sugar/calorie drinks except milk ie no fruit juice, pop, alcohol.  4. Drink milk 30min before meals to decrease your hunger. Also it is excellent as part of your last meal of the day snack  5. Drink lots of water  6. Increase fiber in diet: all bran cereal, salads, popcorn etc  7. Have only one small serving of fruit a day about 1/2 cup (as this is high in sugar)  8. EXERCISE is the bottom line. Without it, you will gain weight even on a low calorie diet. Best if done 2-3X a day as can    Being overweight contributes to high blood pressure and high cholesterol, both of which cause heart attacks, strokes and kidney failure, prediabetes and diabetes, arthritis, and liver disease     6. . Schedule your mammo at [facility]  At [address] at [phone]              Agata Hobson MD  [facility]

## 2017-05-05 NOTE — PATIENT INSTRUCTIONS
1. No difference was  Noted by patients in a double blind study when given codeine, tylenol ( acetaminophen) or ibuprofen (all in identical pills). They felt no difference in pain relief. Since ibuprofen and the NSAIDs  causes kidney damage, esophageal damage with heartburn, and can increase the risk of esophageal and stomach cancer and ulcers,and colonic strictures. They also cause increased risk of heart attack .   I recommend that you use tylenol(acetaminophen) for pain. Use the acetaminophen ES  Which has 500mgm/tablet You can take up to 2 tablets 4 times a day as need for pain.  If this is not enough, you can add in ibuprofen or aleve(naprosyn) with 2 glasses of fluid and some food-to protect the stomach and esophagus. Please let us know if you are continuing to take ibuprofen or aleve, as we will need to periodically check your kidney function with a blood test.    2. You will get a f/u upper endoscope from [facility] for your trouble swallowing  As your gerd  Or heart burn is better    But continue the omeprazole at one a day  Till you see GI and ask them   It takes 6 weeks to heal this burn --and continue the diet     3. To help heal the  High acid causing your heartburn, we will prescribe an acid inhibitor that stops the stomach from producing acid. Please do not eat any acid, including oranges and citric acid fruits, any form of tomato, caffeine in coffee, tea ####and pop, no NSAIDs including aleve, advil, ibuprofen, and naprosyn NO alcohol.  Eat your last food and drink> 5 hrs prior to bedtime  And sleep sitting upright     4.  Weight Loss Tips  1. Do not eat after 6 hrs before your expected bedtime  2. Have your heaviest meal for breakfast, a slightly lighter meal at lunch and a snack 6 hrs before bed  3. No sugar/calorie drinks except milk ie no fruit juice, pop, alcohol.  4. Drink milk 30min before meals to decrease your hunger. Also it is excellent as part of your last meal of the day snack  5. Drink lots  of water  6. Increase fiber in diet: all bran cereal, salads, popcorn etc  7. Have only one small serving of fruit a day about 1/2 cup (as this is high in sugar)  8. EXERCISE is the bottom line. Without it, you will gain weight even on a low calorie diet. Best if done 2-3X a day as can    Being overweight contributes to high blood pressure and high cholesterol, both of which cause heart attacks, strokes and kidney failure, prediabetes and diabetes, arthritis, and liver disease     6. . Schedule your mammo at [facility]  At [address] at [phone]

## 2017-05-05 NOTE — MR AVS SNAPSHOT
After Visit Summary   5/5/2017    Leo Chou    MRN: [id number]           Patient Information     Date Of Birth          1965        Visit Information        Provider Department      5/5/2017 10:20 AM Agata Hobson MD [facility]        Today's Diagnoses     Dysphagia, unspecified type    -  1    Other acute gastritis with hemorrhage        Gastroesophageal reflux disease with esophagitis          Care Instructions    1. No difference was  Noted by patients in a double blind study when given codeine, tylenol ( acetaminophen) or ibuprofen (all in identical pills). They felt no difference in pain relief. Since ibuprofen and the NSAIDs  causes kidney damage, esophageal damage with heartburn, and can increase the risk of esophageal and stomach cancer and ulcers,and colonic strictures. They also cause increased risk of heart attack .   I recommend that you use tylenol(acetaminophen) for pain. Use the acetaminophen ES  Which has 500mgm/tablet You can take up to 2 tablets 4 times a day as need for pain.  If this is not enough, you can add in ibuprofen or aleve(naprosyn) with 2 glasses of fluid and some food-to protect the stomach and esophagus. Please let us know if you are continuing to take ibuprofen or aleve, as we will need to periodically check your kidney function with a blood test.    2. You will get a f/u upper endoscope from [facility] for your trouble swallowing  As your gerd  Or heart burn is better    But continue the omeprazole at one a day  Till you see GI and ask them   It takes 6 weeks to heal this burn --and continue the diet     3. To help heal the  High acid causing your heartburn, we will prescribe an acid inhibitor that stops the stomach from producing acid. Please do not eat any acid, including oranges and citric acid fruits, any form of tomato, caffeine in coffee, tea ####and pop, no NSAIDs including aleve, advil, ibuprofen, and  naprosyn NO alcohol.  Eat your last food and drink> 5 hrs prior to bedtime  And sleep sitting upright     4.  Weight Loss Tips  1. Do not eat after 6 hrs before your expected bedtime  2. Have your heaviest meal for breakfast, a slightly lighter meal at lunch and a snack 6 hrs before bed  3. No sugar/calorie drinks except milk ie no fruit juice, pop, alcohol.  4. Drink milk 30min before meals to decrease your hunger. Also it is excellent as part of your last meal of the day snack  5. Drink lots of water  6. Increase fiber in diet: all bran cereal, salads, popcorn etc  7. Have only one small serving of fruit a day about 1/2 cup (as this is high in sugar)  8. EXERCISE is the bottom line. Without it, you will gain weight even on a low calorie diet. Best if done 2-3X a day as can    Being overweight contributes to high blood pressure and high cholesterol, both of which cause heart attacks, strokes and kidney failure, prediabetes and diabetes, arthritis, and liver disease     6. . Schedule your mammo at [facility]  At [address] at [phone]                Follow-ups after your visit        Follow-up notes from your care team     Return in about 6 months (around 11/5/2017).      Who to contact     If you have questions or need follow up information about today's clinic visit or your schedule please contact [facility] directly at [phone].  Normal or non-critical lab and imaging results will be communicated to you by MyChart, letter or phone within 4 business days after the clinic has received the results. If you do not hear from us within 7 days, please contact the clinic through MyChart or phone. If you have a critical or abnormal lab result, we will notify you by phone as soon as possible.  Submit refill requests through [employer] or call your pharmacy and they will forward the refill request to us. Please allow 3 business days for your refill to be completed.           "Additional Information About Your Visit        Care EveryWhere ID     This is your Care EveryWhere ID. This could be used by other organizations to access your [place] medical records  [id number]        Your Vitals Were     Pulse Temperature Respirations Height Last Period Pulse Oximetry    61 97.2  F (36.2  C) (Tympanic) 14 5' 2\" (1.575 m) (LMP Unknown) 99%    Breastfeeding? BMI (Body Mass Index)                No 25.42 kg/m2           Blood Pressure from Last 3 Encounters:   05/05/17 112/78   04/18/17 120/60   04/13/17 100/70    Weight from Last 3 Encounters:   05/05/17 139 lb (63 kg)   04/18/17 141 lb (64 kg)   04/13/17 140 lb (63.5 kg)              Today, you had the following     No orders found for display         Where to get your medicines      These medications were sent to [place] [address] [place] [address] [address]     Phone:  [phone]     omeprazole 20 MG tablet          Primary Care Provider Office Phone # Fax #    Agata Hobson [phone]       [facility] [address] [facility] [address]        Thank you!     Thank you for choosing [facility]  for your care. Our goal is always to provide you with excellent care. Hearing back from our patients is one way we can continue to improve our services. Please take a few minutes to complete the written survey that you may receive in the mail after your visit with us. Thank you!             Your Updated Medication List - Protect others around you: Learn how to safely use, store and throw away your medicines at www.disposemymeds.org.          This list is accurate as of: 5/5/17 10:57 AM.  Always use your most recent med list.                   Brand Name Dispense Instructions for use    aspirin 81 MG tablet     90 tablet    Take 1 tablet (81 mg) by mouth daily       clarithromycin 500 MG tablet    BIAXIN    28 " tablet    Take 1 tablet (500 mg) by mouth 2 times daily       omeprazole 20 MG tablet     90 tablet    Take 1 tablet (20 mg) by mouth daily Take 30-60 minutes before a meal.       ONE-A-DAY 50 PLUS Tabs      Take 1 tablet by mouth daily.

## 2017-05-05 NOTE — NURSING NOTE
"Chief Complaint   Patient presents with     RECHECK     /78  Pulse 61  Temp 97.2  F (36.2  C) (Tympanic)  Resp 14  Ht 5' 2\" (1.575 m)  Wt 139 lb (63 kg)  LMP  (LMP Unknown)  SpO2 99%  Breastfeeding? No  BMI 25.42 kg/m2 Estimated body mass index is 25.42 kg/(m^2) as calculated from the following:    Height as of this encounter: 5' 2\" (1.575 m).    Weight as of this encounter: 139 lb (63 kg).  BP completed using cuff size: ronel Mccarthy CMA    There are no preventive care reminders to display for this patient.  Health Maintenance reviewed at today's visit patient asked to schedule/complete:   None, Health Maintenance up to date.    "

## 2017-05-09 RX ORDER — AMOXICILLIN 500 MG/1
CAPSULE ORAL
Qty: 28 CAPSULE | Refills: 0 | OUTPATIENT
Start: 2017-05-09

## 2017-05-24 ENCOUNTER — TRANSFERRED RECORDS (OUTPATIENT)
Dept: HEALTH INFORMATION MANAGEMENT | Facility: CLINIC | Age: 52
End: 2017-05-24

## 2018-01-12 ENCOUNTER — TELEPHONE (OUTPATIENT)
Dept: FAMILY MEDICINE | Facility: CLINIC | Age: 53
End: 2018-01-12

## 2018-01-12 ENCOUNTER — OFFICE VISIT (OUTPATIENT)
Dept: FAMILY MEDICINE | Facility: CLINIC | Age: 53
End: 2018-01-12
Payer: COMMERCIAL

## 2018-01-12 VITALS
DIASTOLIC BLOOD PRESSURE: 70 MMHG | RESPIRATION RATE: 18 BRPM | WEIGHT: 132 LBS | SYSTOLIC BLOOD PRESSURE: 110 MMHG | BODY MASS INDEX: 24.14 KG/M2 | OXYGEN SATURATION: 98 % | TEMPERATURE: 98 F | HEART RATE: 84 BPM

## 2018-01-12 DIAGNOSIS — R73.01 IMPAIRED FASTING GLUCOSE: ICD-10-CM

## 2018-01-12 DIAGNOSIS — J01.90 ACUTE NON-RECURRENT SINUSITIS, UNSPECIFIED LOCATION: ICD-10-CM

## 2018-01-12 DIAGNOSIS — Z13.220 LIPID SCREENING: ICD-10-CM

## 2018-01-12 DIAGNOSIS — R07.0 THROAT PAIN: ICD-10-CM

## 2018-01-12 DIAGNOSIS — E66.3 OVERWEIGHT (BMI 25.0-29.9): ICD-10-CM

## 2018-01-12 DIAGNOSIS — H81.10 BENIGN PAROXYSMAL POSITIONAL VERTIGO, UNSPECIFIED LATERALITY: ICD-10-CM

## 2018-01-12 DIAGNOSIS — J20.9 ACUTE BRONCHITIS, UNSPECIFIED ORGANISM: Primary | ICD-10-CM

## 2018-01-12 DIAGNOSIS — R09.82 POST-NASAL DRIP: ICD-10-CM

## 2018-01-12 DIAGNOSIS — R53.83 OTHER FATIGUE: ICD-10-CM

## 2018-01-12 LAB
BASOPHILS # BLD AUTO: 0 10E9/L (ref 0–0.2)
BASOPHILS NFR BLD AUTO: 0.2 %
DIFFERENTIAL METHOD BLD: NORMAL
EOSINOPHIL # BLD AUTO: 0.2 10E9/L (ref 0–0.7)
EOSINOPHIL NFR BLD AUTO: 2.1 %
ERYTHROCYTE [DISTWIDTH] IN BLOOD BY AUTOMATED COUNT: 12 % (ref 10–15)
HCT VFR BLD AUTO: 39.3 % (ref 35–47)
HGB BLD-MCNC: 12.8 G/DL (ref 11.7–15.7)
LYMPHOCYTES # BLD AUTO: 1.9 10E9/L (ref 0.8–5.3)
LYMPHOCYTES NFR BLD AUTO: 19.7 %
MCH RBC QN AUTO: 30.6 PG (ref 26.5–33)
MCHC RBC AUTO-ENTMCNC: 32.6 G/DL (ref 31.5–36.5)
MCV RBC AUTO: 94 FL (ref 78–100)
MONOCYTES # BLD AUTO: 1 10E9/L (ref 0–1.3)
MONOCYTES NFR BLD AUTO: 10.2 %
NEUTROPHILS # BLD AUTO: 6.6 10E9/L (ref 1.6–8.3)
NEUTROPHILS NFR BLD AUTO: 67.8 %
PLATELET # BLD AUTO: 356 10E9/L (ref 150–450)
RBC # BLD AUTO: 4.18 10E12/L (ref 3.8–5.2)
WBC # BLD AUTO: 9.7 10E9/L (ref 4–11)

## 2018-01-12 PROCEDURE — 80061 LIPID PANEL: CPT | Performed by: FAMILY MEDICINE

## 2018-01-12 PROCEDURE — 99214 OFFICE O/P EST MOD 30 MIN: CPT | Performed by: FAMILY MEDICINE

## 2018-01-12 PROCEDURE — 82947 ASSAY GLUCOSE BLOOD QUANT: CPT | Performed by: FAMILY MEDICINE

## 2018-01-12 PROCEDURE — 36415 COLL VENOUS BLD VENIPUNCTURE: CPT | Performed by: FAMILY MEDICINE

## 2018-01-12 PROCEDURE — 85025 COMPLETE CBC W/AUTO DIFF WBC: CPT | Performed by: FAMILY MEDICINE

## 2018-01-12 RX ORDER — AZITHROMYCIN 250 MG/1
TABLET, FILM COATED ORAL
Qty: 6 TABLET | Refills: 0 | Status: SHIPPED | OUTPATIENT
Start: 2018-01-12 | End: 2018-11-23

## 2018-01-12 NOTE — PATIENT INSTRUCTIONS
1. Boil water and breath the warm vapors 2-3 times a day to try to open up the sinuses. Run a steamer or cold air vaporizer as much as possible. Take Mucinex plain blue  1200 mg  One tablet twice a day (This may come as 600mg/tablet and you need to take 2 tabs twice a day) or you could buy generic 400mgm / tab and take 2 tablets 3 x a day or 1 and 1/2 tablets 4 x a day . . Mucinex is guaifenesin, the major component of most cough syrups, because it makes the mucus less thick, and therefore it drains out better and you are less likely to cough from it dripping on the back of your throat.  Irrigate the  nose with plain water under the kitchen sink faucet or the shower.  Abbey pots, spray bottles, etc accumulate bacteria and are not recommended.   The tickle in the throat is also helped by gargling with vinegar and honey mixture, or pop or mouth wash as these coat the throat.  Please try to rinse teeth with water after using these .     2.  Weight Loss Tips  1. Do not eat after 6 hrs before your expected bedtime  2. Have your heaviest meal for breakfast, a slightly lighter meal at lunch and a snack 6 hrs before bed  3. No sugar/calorie drinks except milk ie no fruit juice, pop, alcohol.  4. Drink milk 30min before meals to decrease your hunger. Also it is excellent as part of your last meal of the day snack  5. Drink lots of water  6. Increase fiber in diet: all bran cereal, salads, popcorn etc  7. Have only one small serving of fruit a day about 1/2 cup (as this is high in sugar)  8. EXERCISE is the bottom line. Without it, you will gain weight even on a low calorie diet. Best if done 2-3X a day as can    Being overweight contributes to high blood pressure and high cholesterol, both of which cause heart attacks, strokes and kidney failure, prediabetes and diabetes, arthritis, and liver disease     3. . Schedule your mammo at [facility]  At [address] at [phone]     you are overdue!!!

## 2018-01-12 NOTE — LETTER
January 17, 2018      Leo Chou  [address] [address]        Dear ,    We are writing to inform you of your test results.    They are all normal     THE FOLLOWING ARE EXPLANATIONS OF SOME OF OUR LAB TESTS     YOU DID NOT NECESSARILY HAVE ALL OF THESE DONE     Hgb is the blood iron level   WBC means White Blood Cells   Platelets are small blood cells that help with forming the blood clots along with other blood factors.   Electrolytes are Sodium, Potassium, Calcium, Magnesium, Phosphorus.   Liver tests are: AST, ALT, Bilirubin, Alkaline Phosphatase.   Kidney tests are Creatinine, GFR.   HDL Cholesterol - is the good cholesterol and it is good to have it high.   LDL cholesterol is the bad cholesterol and it is good to have it low.   It is recommended to have LDL less than 130 for people with hypertension and to have it less than 100 for people with heart disease, diabetes and chronic kidney disease.   Triglycerides are another type of lipid that can cause heart disease, like the cholesterol and should be kept low   Thyroid tests are TSH, T4, T3   Glucose is sugar.###continues to be just alittle high ###   A1c is a test that gives us an idea about how well was controlled the diabetes for the last 3 months.   PSA stands for Prostate Specific Antigen and it can be elevated with prostate cancer or prostate inflammation.     Please continue on the same medications     Resulted Orders   Lipid panel reflex to direct LDL Fasting   Result Value Ref Range    Cholesterol 174 <200 mg/dL    Triglycerides 152 (H) <150 mg/dL      Comment:      Borderline high:  150-199 mg/dl  High:             200-499 mg/dl  Very high:       >499 mg/dl  Non Fasting      HDL Cholesterol 63 >49 mg/dL    LDL Cholesterol Calculated 81 <100 mg/dL      Comment:      Desirable:       <100 mg/dl    Non HDL Cholesterol 111 <130 mg/dL   CBC with platelets differential   Result Value Ref Range    WBC 9.7 4.0 - 11.0 10e9/L     RBC Count 4.18 3.8 - 5.2 10e12/L    Hemoglobin 12.8 11.7 - 15.7 g/dL    Hematocrit 39.3 35.0 - 47.0 %    MCV 94 78 - 100 fl    MCH 30.6 26.5 - 33.0 pg    MCHC 32.6 31.5 - 36.5 g/dL    RDW 12.0 10.0 - 15.0 %    Platelet Count 356 150 - 450 10e9/L    Diff Method Automated Method     % Neutrophils 67.8 %    % Lymphocytes 19.7 %    % Monocytes 10.2 %    % Eosinophils 2.1 %    % Basophils 0.2 %    Absolute Neutrophil 6.6 1.6 - 8.3 10e9/L    Absolute Lymphocytes 1.9 0.8 - 5.3 10e9/L    Absolute Monocytes 1.0 0.0 - 1.3 10e9/L    Absolute Eosinophils 0.2 0.0 - 0.7 10e9/L    Absolute Basophils 0.0 0.0 - 0.2 10e9/L   Glucose   Result Value Ref Range    Glucose 108 (H) 70 - 99 mg/dL      Comment:      Non Fasting       If you have any questions or concerns, please call the clinic at the number listed above.       Sincerely,        Agata Hobson MD

## 2018-01-12 NOTE — PROGRESS NOTES
SUBJECTIVE:   Leo Chou is a 52 year old female who presents to clinic today for the following health issues:      RESPIRATORY SYMPTOMS      Duration: 2 weeks    Description  sore throat, cough, headache, fatigue/malaise and dizzy    Severity: moderate    Accompanying signs and symptoms: None    History (predisposing factors):  none    Precipitating or alleviating factors: None    Therapies tried and outcome:  rest and fluids, benadryl, dayquil    Nonsmoker     Expos: work     OVERWEIGHT    -BMI = 25.5   -comorbid glu intol \\      Glucose Intolerance   Follow-up      Patient is checking blood sugars: not at all    FBS to 110     Diabetic concerns: None     Symptoms of hypoglycemia (low blood sugar): none     Paresthesias (numbness or burning in feet) or sores: No     Date of last diabetic eye exam: 2017    BP Readings from Last 2 Encounters:   01/12/18 110/70   05/05/17 112/78     LDL Cholesterol Calculated (mg/dL)   Date Value   04/10/2003 90     LDL Cholesterol Direct (mg/dL)   Date Value   10/12/2012 105   06/16/2011 80.0           Problem list and histories reviewed & adjusted, as indicated.  Additional history: as documented    Labs reviewed in EPIC    Reviewed and updated as needed this visit by clinical staffAllergies       Reviewed and updated as needed this visit by Provider         ROS:  C: NEGATIVE for fever, chills, change in weight  I: NEGATIVE for worrisome rashes, moles or lesions  E: NEGATIVE for vision changes or irritation  ENT/MOUTH: POSITIVE for , hoarse voice, nasal congestion, postnasal drainage, rhinorrhea-clear, sneezing, sore throat and vertigo  RESP:POSITIVE for  and cough-productive  B: NEGATIVE for masses, tenderness or discharge  CV: NEGATIVE for chest pain, palpitations or peripheral edema  GI: NEGATIVE for nausea, abdominal pain, heartburn, or change in bowel habits  : NEGATIVE for frequency, dysuria, or hematuria  M: NEGATIVE for significant arthralgias or myalgia  N:  NEGATIVE for weakness, dizziness or paresthesias  E: NEGATIVE for temperature intolerance, skin/hair changes  H: NEGATIVE for bleeding problems  P: NEGATIVE for changes in mood or affect    OBJECTIVE:     /70  Pulse 84  Temp 98  F (36.7  C) (Tympanic)  Resp 18  Wt 132 lb (59.9 kg)  LMP  (LMP Unknown)  SpO2 98%  BMI 24.14 kg/m2  Body mass index is 24.14 kg/(m^2).  GENERAL: healthy, alert, no distress and over weight  EYES: Eyes grossly normal to inspection, PERRL and conjunctivae and sclerae normal  HENT: ear canals and TM's normal, nose and mouth without ulcers or lesions  NECK: no adenopathy, no asymmetry, masses, or scars and thyroid normal to palpation  RESP: lungs clear to auscultation - no rales, rhonchi or wheezes  CV: regular rate and rhythm, normal S1 S2, no S3 or S4, no murmur, click or rub, no peripheral edema and peripheral pulses strong  MS: no gross musculoskeletal defects noted, no edema  SKIN: no suspicious lesions or rashes  NEURO: Normal strength and tone, mentation intact and speech normal  PSYCH: mentation appears normal, affect normal/bright    Diagnostic Test Results:  Results for orders placed or performed in visit on 01/12/18 (from the past 24 hour(s))   CBC with platelets differential   Result Value Ref Range    WBC 9.7 4.0 - 11.0 10e9/L    RBC Count 4.18 3.8 - 5.2 10e12/L    Hemoglobin 12.8 11.7 - 15.7 g/dL    Hematocrit 39.3 35.0 - 47.0 %    MCV 94 78 - 100 fl    MCH 30.6 26.5 - 33.0 pg    MCHC 32.6 31.5 - 36.5 g/dL    RDW 12.0 10.0 - 15.0 %    Platelet Count 356 150 - 450 10e9/L    Diff Method Automated Method     % Neutrophils 67.8 %    % Lymphocytes 19.7 %    % Monocytes 10.2 %    % Eosinophils 2.1 %    % Basophils 0.2 %    Absolute Neutrophil 6.6 1.6 - 8.3 10e9/L    Absolute Lymphocytes 1.9 0.8 - 5.3 10e9/L    Absolute Monocytes 1.0 0.0 - 1.3 10e9/L    Absolute Eosinophils 0.2 0.0 - 0.7 10e9/L    Absolute Basophils 0.0 0.0 - 0.2 10e9/L       ASSESSMENT/PLAN:                ICD-10-CM    1. Acute bronchitis, unspecified organism J20.9 CBC with platelets differential     azithromycin (ZITHROMAX) 250 MG tablet   2. Acute non-recurrent sinusitis, unspecified location J01.90 CBC with platelets differential     azithromycin (ZITHROMAX) 250 MG tablet   3. Throat pain R07.0    4. Post-nasal drip R09.82    5. Other fatigue from insomnia from coughing  R53.83 CBC with platelets differential   6. Benign paroxysmal positional vertigo, unspecified laterality from sinus congestion & ETD H81.10 CBC with platelets differential   7. Impaired fasting glucose R73.01 Glucose   8. Overweight (BMI 25.0-29.9) E66.3 Glucose   9. Lipid screening Z13.220 Lipid panel reflex to direct LDL Fasting       Patient Instructions   1. Boil water and breath the warm vapors 2-3 times a day to try to open up the sinuses. Run a steamer or cold air vaporizer as much as possible. Take Mucinex plain blue  1200 mg  One tablet twice a day (This may come as 600mg/tablet and you need to take 2 tabs twice a day) or you could buy generic 400mgm / tab and take 2 tablets 3 x a day or 1 and 1/2 tablets 4 x a day . . Mucinex is guaifenesin, the major component of most cough syrups, because it makes the mucus less thick, and therefore it drains out better and you are less likely to cough from it dripping on the back of your throat.  Irrigate the  nose with plain water under the kitchen sink faucet or the shower.  Abbey pots, spray bottles, etc accumulate bacteria and are not recommended.   The tickle in the throat is also helped by gargling with vinegar and honey mixture, or pop or mouth wash as these coat the throat.  Please try to rinse teeth with water after using these .     2.  Weight Loss Tips  1. Do not eat after 6 hrs before your expected bedtime  2. Have your heaviest meal for breakfast, a slightly lighter meal at lunch and a snack 6 hrs before bed  3. No sugar/calorie drinks except milk ie no fruit juice, pop, alcohol.  4.  Drink milk 30min before meals to decrease your hunger. Also it is excellent as part of your last meal of the day snack  5. Drink lots of water  6. Increase fiber in diet: all bran cereal, salads, popcorn etc  7. Have only one small serving of fruit a day about 1/2 cup (as this is high in sugar)  8. EXERCISE is the bottom line. Without it, you will gain weight even on a low calorie diet. Best if done 2-3X a day as can    Being overweight contributes to high blood pressure and high cholesterol, both of which cause heart attacks, strokes and kidney failure, prediabetes and diabetes, arthritis, and liver disease     3. . Schedule your mammo at [facility]  At [address] at [phone]     you are overdue!!!     I  Explained the treatment and the reason for it       Agata Hobson MD  [facility]    Weight management plan: Discussed healthy diet and exercise guidelines and patient will follow up in 6 months in clinic to re-evaluate.    Agata Hobson MD

## 2018-01-12 NOTE — MR AVS SNAPSHOT
After Visit Summary   1/12/2018    Leo Chou    MRN: [id number]           Patient Information     Date Of Birth          1965        Visit Information        Provider Department      1/12/2018 3:00 PM Agata Hobson MD [facility]        Today's Diagnoses     Acute bronchitis, unspecified organism    -  1    Acute non-recurrent sinusitis, unspecified location        Throat pain        Post-nasal drip        Other fatigue from insomnia from coughing         Benign paroxysmal positional vertigo, unspecified laterality from sinus congestion & ETD        Impaired fasting glucose        Overweight (BMI 25.0-29.9)        Lipid screening          Care Instructions    1. Boil water and breath the warm vapors 2-3 times a day to try to open up the sinuses. Run a steamer or cold air vaporizer as much as possible. Take Mucinex plain blue  1200 mg  One tablet twice a day (This may come as 600mg/tablet and you need to take 2 tabs twice a day) or you could buy generic 400mgm / tab and take 2 tablets 3 x a day or 1 and 1/2 tablets 4 x a day . . Mucinex is guaifenesin, the major component of most cough syrups, because it makes the mucus less thick, and therefore it drains out better and you are less likely to cough from it dripping on the back of your throat.  Irrigate the  nose with plain water under the kitchen sink faucet or the shower.  Abbey pots, spray bottles, etc accumulate bacteria and are not recommended.   The tickle in the throat is also helped by gargling with vinegar and honey mixture, or pop or mouth wash as these coat the throat.  Please try to rinse teeth with water after using these .     2.  Weight Loss Tips  1. Do not eat after 6 hrs before your expected bedtime  2. Have your heaviest meal for breakfast, a slightly lighter meal at lunch and a snack 6 hrs before bed  3. No sugar/calorie drinks except milk ie no fruit juice, pop, alcohol.  4.  "Drink milk 30min before meals to decrease your hunger. Also it is excellent as part of your last meal of the day snack  5. Drink lots of water  6. Increase fiber in diet: all bran cereal, salads, popcorn etc  7. Have only one small serving of fruit a day about 1/2 cup (as this is high in sugar)  8. EXERCISE is the bottom line. Without it, you will gain weight even on a low calorie diet. Best if done 2-3X a day as can    Being overweight contributes to high blood pressure and high cholesterol, both of which cause heart attacks, strokes and kidney failure, prediabetes and diabetes, arthritis, and liver disease     3. . Schedule your mammo at [facility]  At [address] at [phone]     you are overdue!!!           Follow-ups after your visit        Who to contact     If you have questions or need follow up information about today's clinic visit or your schedule please contact [facility] directly at [phone].  Normal or non-critical lab and imaging results will be communicated to you by Ullinkhart, letter or phone within 4 business days after the clinic has received the results. If you do not hear from us within 7 days, please contact the clinic through scroll kitt or phone. If you have a critical or abnormal lab result, we will notify you by phone as soon as possible.  Submit refill requests through ColorPlaza or call your pharmacy and they will forward the refill request to us. Please allow 3 business days for your refill to be completed.          Additional Information About Your Visit        ColorPlaza Information     ColorPlaza lets you send messages to your doctor, view your test results, renew your prescriptions, schedule appointments and more. To sign up, go to [email or web address] . Click on \"Log in\" on the left side of the screen, which will take you to the Welcome page. Then click on \"Sign up Now\" on the right side of the page.     You will be asked to enter the access " code listed below, as well as some personal information. Please follow the directions to create your username and password.     Your access code is: [email or web address]  Expires: 2018  4:08 PM     Your access code will  in 90 days. If you need help or a new code, please call your [facility] or [phone].        Care EveryWhere ID     This is your Care EveryWhere ID. This could be used by other organizations to access your [place] medical records  [id number]        Your Vitals Were     Pulse Temperature Respirations Last Period Pulse Oximetry BMI (Body Mass Index)    84 98  F (36.7  C) (Tympanic) 18 (LMP Unknown) 98% 24.14 kg/m2       Blood Pressure from Last 3 Encounters:   18 110/70   17 112/78   17 120/60    Weight from Last 3 Encounters:   18 132 lb (59.9 kg)   17 139 lb (63 kg)   17 141 lb (64 kg)              We Performed the Following     CBC with platelets differential     Glucose     Lipid panel reflex to direct LDL Fasting          Today's Medication Changes          These changes are accurate as of: 18  4:08 PM.  If you have any questions, ask your nurse or doctor.               Start taking these medicines.        Dose/Directions    azithromycin 250 MG tablet   Commonly known as:  ZITHROMAX   Used for:  Acute bronchitis, unspecified organism, Acute non-recurrent sinusitis, unspecified location   Started by:  Agata Hobson MD        Two tablets first day, then one tablet daily for four days   Quantity:  6 tablet   Refills:  0            Where to get your medicines      These medications were sent to [place] [address] [place] [address] [address]     Phone:  [phone]     azithromycin 250 MG tablet                Primary Care Provider Office Phone # Fax #    Agata Hobson [phone] [address]  [facility] [address]        Equal  Access to Services     [facility]: Hadii aad ku hadasim Caraballo, wasarada luqadaha, qaybta kamarvaruben heredia. So [facility] [phone].    ATENCIÓN: Si habla español, tiene a delatorre disposición servicios gratuitos de asistencia lingüística. Llame al [phone].    We comply with applicable federal civil rights laws and [place] laws. We do not discriminate on the basis of race, color, national origin, age, disability, sex, sexual orientation, or gender identity.            Thank you!     Thank you for choosing [facility]  for your care. Our goal is always to provide you with excellent care. Hearing back from our patients is one way we can continue to improve our services. Please take a few minutes to complete the written survey that you may receive in the mail after your visit with us. Thank you!             Your Updated Medication List - Protect others around you: Learn how to safely use, store and throw away your medicines at www.disposemymeds.org.          This list is accurate as of: 1/12/18  4:08 PM.  Always use your most recent med list.                   Brand Name Dispense Instructions for use Diagnosis    aspirin 81 MG tablet     90 tablet    Take 1 tablet (81 mg) by mouth daily    Routine general medical examination at a health care facility       azithromycin 250 MG tablet    ZITHROMAX    6 tablet    Two tablets first day, then one tablet daily for four days    Acute bronchitis, unspecified organism, Acute non-recurrent sinusitis, unspecified location       omeprazole 20 MG tablet     90 tablet    Take 1 tablet (20 mg) by mouth daily Take 30-60 minutes before a meal.    Other acute gastritis with hemorrhage       ONE-A-DAY 50 PLUS Tabs      Take 1 tablet by mouth daily.

## 2018-01-12 NOTE — NURSING NOTE
"Chief Complaint   Patient presents with     URI       Initial /70  Pulse 84  Temp 98  F (36.7  C) (Tympanic)  Resp 18  Wt 132 lb (59.9 kg)  LMP  (LMP Unknown)  SpO2 98%  BMI 24.14 kg/m2 Estimated body mass index is 24.14 kg/(m^2) as calculated from the following:    Height as of 5/5/17: 5' 2\" (1.575 m).    Weight as of this encounter: 132 lb (59.9 kg).  Medication Reconciliation: complete     Farideh Hoskins CMA      "

## 2018-01-12 NOTE — TELEPHONE ENCOUNTER
Influenza-Like Illness (JAYLYN) Protocol    Leo YESSI Chou      Age: 52 year old     YOB: 1965    Are you currently sick or have you had close contact with someone who is currently sick?   Yes, this patient is currently sick.     Adult Clinical Evaluation    Is this patient experiencing ANY of the following?  Unconsciousness or unresponsiveness No   Difficulty breathing or swallowing No   Blue or dusky lips, skin, or nail beds No   Chest pain No   Severe confusion or delirium No   Seizure activity: ongoing or stopped No   Severe dehydration or signs of shock No   Patient sounds very sick on the phone No     Is this patient experiencing ANY of the following?  Fever > 104 or shaking chills No   Wheezing with minimal response to usual wheezing medications or new wheezing No   Repeated vomiting or diarrhea with signs of dehydration (no urination within last 12 hours) Last week diarrhea   Flu-like symptoms that initially improved but returned with fever and a worse cough No   Stiff or painful neck No   Severe headache Yes.  Patient advised to be taken to the ED now.     Does the patient have any of the following?  Measured fever > 100 degrees Has had chills   Chills or feels very warm to the touch Yes   Cough No   Sore throat Yes   Muscle/ body aches Yes   Headaches Yes   Fatigue (tiredness) Yes     Nursing Plan    Scheduled appointment   Has had symptoms for 2 weeks. Not getting any better and feeling weak and not sleeping.    Provided home care instructions    General home care instruction:      Avoid contact with people in your household who are at increased risk for more severe complications of influenza (such as pregnant women or people who have a chronic health condition, for example diabetes, heart disease, asthma, or emphysema).    Stay home from work, school, childcare or other public places until your fever (37.8 degrees Celsius [100 degrees Fahrenheit]) has been gone for at least 24 hours,  except to seek medical care. (Fever should be gone without the use of fever-reducing medications.) Use a surgical mask if available, or cover your mouth and nose with a tissue if possible if you need to seek medical care. Contact your work place, school, or  as they may have longer exclusion times.    You may continue to shed virus after your fever is gone. Limit your contact with high-risk individuals for 10 days after your symptoms started and be especially careful to cover your coughs/sneezes and wash your hands.    Cover your cough and wash your hands often, and especially after coughing, sneezing, blowing your nose.    Drink plenty of fluids (such as water, broth, sports drinks, electrolyte beverages for children) to prevent dehydration.    Avoid tobacco and second hand smoke.    Get plenty of rest.    Use over-the-counter pain relievers as needed per  instructions.    Do not give aspirin (acetylsalicylic acid) or products that contain aspirin (e.g. bismuth subsalicylate - Pepto Bismol) to children or teenagers 18 years or younger.    A small number of people with influenza do not have fever. If you have respiratory symptoms and are at increased risk for complications of influenza, contact your health care provider to discuss these symptoms.    For parents of infants:    If possible, only family members who are not sick should care for infants.    Wash your hands with soap and water, or an alcohol-based hand rub (if your hands are not visibly soiled) before caring for your infant.    Cover your mouth and nose with a tissue when coughing or sneezing, and clean your hands.    Contact a health care provider to discuss your illness within 1-2 days if you are    Pregnant    Immunocompromised    Call 911 if you experience:    Difficulty breathing or shortness of breath    Pain or pressure in the chest    Confusion or less responsive than normal    Seizure activity: ongoing or stopped    Severe  dehydration or signs of shock     Blue or dusky lips, skin, or nail beds    If further questions/concerns or if new symptoms develop, call your PCP or [facility] as soon as possible.    When to seek medical attention    Contact your health care provider right away if you experience:    A painful sore throat accompanied by fever persists for more than 48 hours    Ear pain, sinus pain, persistent vomiting and/or diarrhea    Oral temperature greater than 104  Fahrenheit (40  Celsius)    Dehydration (e.g., mouth feeling dry, dizzy when sitting/standing, decreased urine output)    Severe or persistent vomiting; unable to keep fluids down    Improvement in flu-like symptoms (fever and cough or sore throat) but then return of fever and worse cough or sore throat    Not drinking enough fluid    Any other concerns not stated above        Additional educational resources include:    http://www.[employer].com    http://www.cdc.gov/flu/  Patito Mcqueen

## 2018-01-13 LAB
CHOLEST SERPL-MCNC: 174 MG/DL
GLUCOSE SERPL-MCNC: 108 MG/DL (ref 70–99)
HDLC SERPL-MCNC: 63 MG/DL
LDLC SERPL CALC-MCNC: 81 MG/DL
NONHDLC SERPL-MCNC: 111 MG/DL
TRIGL SERPL-MCNC: 152 MG/DL

## 2018-01-17 NOTE — PROGRESS NOTES
.  Please see attached lab results  They are all normal     THE FOLLOWING ARE EXPLANATIONS OF SOME OF OUR LAB TESTS     YOU DID NOT NECESSARILY HAVE ALL OF THESE DONE     Hgb is the blood iron level  WBC means White Blood Cells  Platelets are small blood cells that help with forming the blood clots along with other blood factors.  Electrolytes are Sodium, Potassium, Calcium, Magnesium, Phosphorus.  Liver tests are: AST, ALT, Bilirubin, Alkaline Phosphatase.  Kidney tests are Creatinine, GFR.  HDL Cholesterol - is the good cholesterol and it is good to have it high.  LDL cholesterol is the bad cholesterol and it is good to have it low.  It is recommended to have LDL less than 130 for people with hypertension and to have it less than 100 for people with heart disease, diabetes and chronic kidney disease.  Triglycerides are another type of lipid that can cause heart disease, like the cholesterol and should be kept low   Thyroid tests are TSH, T4, T3  Glucose is sugar.###continues to be just alittle high ###  A1c is a test that gives us an idea about how well was controlled the diabetes for the last 3 months.   PSA stands for Prostate Specific Antigen and it can be elevated with prostate cancer or prostate inflammation.    Please continue on the same medications

## 2018-02-09 ENCOUNTER — TELEPHONE (OUTPATIENT)
Dept: FAMILY MEDICINE | Facility: CLINIC | Age: 53
End: 2018-02-09

## 2018-02-09 NOTE — TELEPHONE ENCOUNTER
Panel Management Review      Patient has the following on her problem list: None      Composite cancer screening  Chart review shows that this patient is due/due soon for the following Mammogram  Summary:    Patient is due/failing the following:   MAMMOGRAM    Action needed:   Patient needs office visit for Mammo.    Type of outreach:    Sent letter.    Questions for provider review:    None                                                                                                                                    Marii Mccarthy CMA       Chart routed to NA .

## 2018-02-09 NOTE — LETTER
February 9, 2018    Leo YESSI Effie  [address] [address]    Dear Candy Bailey cares about your health and your health plan.  I have reviewed your medical conditions, medication list and lab results, and am making recommendations based on this review to better manage your health.    You are in particular need of attention regarding:  -Breast Cancer Screening    I am recommending that you:     -schedule a MAMMOGRAM which is due.    1 in 8 women will develop invasive breast cancer during her lifetime and it is the most common non-skin cancer in American women.  EARLY detection, new treatments, and a better understanding of the disease have increased survival rates - the 5 year survival rate in the 1960s was 63% and today it is close to 90%.    If you are under/uninsured, we recommend you contact the [facility]. They offer mammograms at no charge or on a sliding fee charge. You can schedule with them at [phone]. Please have them send us the results.      Please disregard this reminder if you have had this exam elsewhere within the last year.  It would be helpful for us to have a copy of your mammogram report in your file so that we can best coordinate your care - please contact us with when your test was done so we can update your record.               Please call us at the "[employer] location:  [phone] or use [facility] to address the above recommendations.     Thank you for trusting [facility].  We appreciate the opportunity to serve you and look forward to supporting your healthcare in the future.    If you have (or plan to have) any of these tests done at a facility other than a [facility] or a [facility], please have the results sent to the [facility] location noted above.      Best Regards,    Agata Hobson MD

## 2018-04-09 ENCOUNTER — OFFICE VISIT (OUTPATIENT)
Dept: FAMILY MEDICINE | Facility: CLINIC | Age: 53
End: 2018-04-09
Payer: COMMERCIAL

## 2018-04-09 VITALS
TEMPERATURE: 98.4 F | SYSTOLIC BLOOD PRESSURE: 100 MMHG | BODY MASS INDEX: 22.77 KG/M2 | WEIGHT: 124.5 LBS | DIASTOLIC BLOOD PRESSURE: 72 MMHG | HEART RATE: 82 BPM | RESPIRATION RATE: 20 BRPM | OXYGEN SATURATION: 100 %

## 2018-04-09 DIAGNOSIS — R35.0 URINARY FREQUENCY: ICD-10-CM

## 2018-04-09 DIAGNOSIS — J01.01 ACUTE RECURRENT MAXILLARY SINUSITIS: Primary | ICD-10-CM

## 2018-04-09 DIAGNOSIS — R42 DIZZINESS: ICD-10-CM

## 2018-04-09 DIAGNOSIS — R73.09 ELEVATED GLUCOSE: ICD-10-CM

## 2018-04-09 LAB
ALBUMIN UR-MCNC: ABNORMAL MG/DL
APPEARANCE UR: CLEAR
BILIRUB UR QL STRIP: NEGATIVE
COLOR UR AUTO: YELLOW
GLUCOSE UR STRIP-MCNC: NEGATIVE MG/DL
HBA1C MFR BLD: 5.4 % (ref 0–6.4)
HGB UR QL STRIP: ABNORMAL
KETONES UR STRIP-MCNC: ABNORMAL MG/DL
LEUKOCYTE ESTERASE UR QL STRIP: ABNORMAL
NITRATE UR QL: NEGATIVE
NON-SQ EPI CELLS #/AREA URNS LPF: NORMAL /LPF
PH UR STRIP: 7 PH (ref 5–7)
RBC #/AREA URNS AUTO: NORMAL /HPF
SOURCE: ABNORMAL
SP GR UR STRIP: 1.01 (ref 1–1.03)
UROBILINOGEN UR STRIP-ACNC: 0.2 EU/DL (ref 0.2–1)
WBC #/AREA URNS AUTO: NORMAL /HPF

## 2018-04-09 PROCEDURE — 36415 COLL VENOUS BLD VENIPUNCTURE: CPT | Performed by: FAMILY MEDICINE

## 2018-04-09 PROCEDURE — 80053 COMPREHEN METABOLIC PANEL: CPT | Performed by: FAMILY MEDICINE

## 2018-04-09 PROCEDURE — 99214 OFFICE O/P EST MOD 30 MIN: CPT | Performed by: FAMILY MEDICINE

## 2018-04-09 PROCEDURE — 81001 URINALYSIS AUTO W/SCOPE: CPT | Performed by: FAMILY MEDICINE

## 2018-04-09 PROCEDURE — 83036 HEMOGLOBIN GLYCOSYLATED A1C: CPT | Performed by: FAMILY MEDICINE

## 2018-04-09 RX ORDER — AMOXICILLIN 500 MG/1
500 CAPSULE ORAL 3 TIMES DAILY
Qty: 30 CAPSULE | Refills: 0 | Status: SHIPPED | OUTPATIENT
Start: 2018-04-09 | End: 2018-11-23

## 2018-04-09 NOTE — LETTER
April 13, 2018      Leo Chou  [address] [address]        Dear ,    We are writing to inform you of your test results.    Hemoglobin A1c is normal, no evidence for diabetes  Metabolic panel is normal  Urine tests are essentially normal, no evidence for infection    Resulted Orders   Comprehensive metabolic panel   Result Value Ref Range    Sodium 135 133 - 144 mmol/L    Potassium 3.8 3.4 - 5.3 mmol/L    Chloride 100 94 - 109 mmol/L    Carbon Dioxide 29 20 - 32 mmol/L    Anion Gap 6 3 - 14 mmol/L    Glucose 95 70 - 99 mg/dL      Comment:      Fasting specimen    Urea Nitrogen 8 7 - 30 mg/dL    Creatinine 0.61 0.52 - 1.04 mg/dL    GFR Estimate >90 >60 mL/min/1.7m2      Comment:      Non  GFR Calc    GFR Estimate If Black >90 >60 mL/min/1.7m2      Comment:       GFR Calc    Calcium 9.4 8.5 - 10.1 mg/dL    Bilirubin Total 0.4 0.2 - 1.3 mg/dL    Albumin 4.3 3.4 - 5.0 g/dL    Protein Total 8.3 6.8 - 8.8 g/dL    Alkaline Phosphatase 54 40 - 150 U/L    ALT 31 0 - 50 U/L    AST 27 0 - 45 U/L   Hemoglobin A1c   Result Value Ref Range    Hemoglobin A1C 5.4 0 - 6.4 %      Comment:      Normal <5.7% Prediabetes 5.7-6.4%  Diabetes 6.5% or higher - adopted from ADA   consensus guidelines.     *UA reflex to Microscopic and Culture ([place] and [facility] (except [place] and [place])   Result Value Ref Range    Color Urine Yellow     Appearance Urine Clear     Glucose Urine Negative NEG^Negative mg/dL    Bilirubin Urine Negative NEG^Negative    Ketones Urine Trace (A) NEG^Negative mg/dL    Specific Gravity Urine 1.015 1.003 - 1.035    Blood Urine Small (A) NEG^Negative    pH Urine 7.0 5.0 - 7.0 pH    Protein Albumin Urine Trace (A) NEG^Negative mg/dL    Urobilinogen Urine 0.2 0.2 - 1.0 EU/dL    Nitrite Urine Negative NEG^Negative    Leukocyte Esterase Urine Trace (A) NEG^Negative    Source Midstream Urine    Urine Microscopic   Result Value Ref Range     WBC Urine 0 - 5 OTO5^0 - 5 /HPF    RBC Urine O - 2 OTO2^O - 2 /HPF    Squamous Epithelial /LPF Urine Few FEW^Few /LPF       If you have any questions or concerns, please call the clinic at the number listed above.       Sincerely,        Kameron Tang MD

## 2018-04-09 NOTE — NURSING NOTE
"Chief Complaint   Patient presents with     URI     vomiting and dizziness       Initial /72 (BP Location: Left arm, Patient Position: Sitting, Cuff Size: Adult Regular)  Pulse 82  Temp 98.4  F (36.9  C) (Tympanic)  Resp 20  Wt 124 lb 8 oz (56.5 kg)  LMP  (LMP Unknown)  SpO2 100%  BMI 22.77 kg/m2 Estimated body mass index is 22.77 kg/(m^2) as calculated from the following:    Height as of 5/5/17: 5' 2\" (1.575 m).    Weight as of this encounter: 124 lb 8 oz (56.5 kg).  Medication Reconciliation: complete     Princess RASHID Shook CMA      "

## 2018-04-09 NOTE — PROGRESS NOTES
SUBJECTIVE:   Leo Chou is a 52 year old female who presents to clinic today for the following health issues:      Also complains of dizziness and urinary frequency. Increased thirst at night. Drinks about 10 bottles a night.    Acute Illness   Acute illness concerns: Dizziness and not feeling well  Onset: x 1 week    Fever: YES    Chills/Sweats: YES    Headache (location?): no    Sinus Pressure:no    Conjunctivitis:  no    Ear Pain: no    Rhinorrhea: YES    Congestion: YES    Sore Throat: YES     Cough: YES-productive of clear sputum    Wheeze: YES    Decreased Appetite: YES    Nausea: YES    Vomiting: YES    Diarrhea:  YES    Dysuria/Freq.: YES     Fatigue/Achiness: YES    Sick/Strep Exposure: no      Therapies Tried and outcome: Nyquil  Outcome:  Pain and pressure in sinuses      Worried about recent lab results letter from January    Problem list and histories reviewed & adjusted, as indicated.  Additional history: as documented    Labs reviewed in EPIC    Reviewed and updated as needed this visit by clinical staff  Allergies       Reviewed and updated as needed this visit by Provider         ROS:  CONSTITUTIONAL:NEGATIVE for fever, chills, change in weight  ENT/MOUTH: POSITIVE for nasal congestion, postnasal drainage, rhinorrhea-purulent and sinus pressure  RESP:POSITIVE for cough-non productive  : frequency every 1-2 hrs  NEURO: POSITIVE for dizziness/lightheadedness    OBJECTIVE:                                                    /72 (BP Location: Left arm, Patient Position: Sitting, Cuff Size: Adult Regular)  Pulse 82  Temp 98.4  F (36.9  C) (Tympanic)  Resp 20  Wt 124 lb 8 oz (56.5 kg)  LMP  (LMP Unknown)  SpO2 100%  BMI 22.77 kg/m2  Body mass index is 22.77 kg/(m^2).  GENERAL APPEARANCE: healthy, alert and no distress  HENT: ear canals and TM's normal, nose and mouth without ulcers or lesions, nasal mucosa edematous without rhinorrhea, rhinorrhea yellow and maxillary sinus  tenderness bilateral  NECK: no adenopathy, no asymmetry, masses, or scars, thyroid normal to palpation and no bruits  RESP: lungs clear to auscultation - no rales, rhonchi or wheezes   (female): exam deferred, no suprapubic tenderness  MS: no CVA tenderness  NEURO: Normal strength and tone, mentation intact, speech normal and Romberg negative    Diagnostic test results:  Lab: see below, results pending     ASSESSMENT/PLAN:                                                        ICD-10-CM    1. Acute recurrent maxillary sinusitis J01.01 amoxicillin (AMOXIL) 500 MG capsule   2. Dizziness R42 Comprehensive metabolic panel   3. Urinary frequency R35.0 *UA reflex to Microscopic and Culture ([place] and [facility] (except [place] and [place])   4. Elevated glucose R73.09 Comprehensive metabolic panel     Hemoglobin A1c       Follow up with Provider - as needed   Patient Instructions   Symptomatic treatment.  Will use saline gargles, tylenol and/or advil. Suck on  lozenges as needed. Push fluids. Salt water nasal spray as needed.      Kameron Tang MD  [facility]

## 2018-04-09 NOTE — MR AVS SNAPSHOT
"              After Visit Summary   4/9/2018    Leo Chou    MRN: [id number]           Patient Information     Date Of Birth          1965        Visit Information        Provider Department      4/9/2018 11:15 AM Kameron Tang MD [facility]        Today's Diagnoses     Acute recurrent maxillary sinusitis    -  1    Dizziness        Urinary frequency        Elevated glucose          Care Instructions    Symptomatic treatment.  Will use saline gargles, tylenol and/or advil. Suck on  lozenges as needed. Push fluids. Salt water nasal spray as needed.          Follow-ups after your visit        Who to contact     If you have questions or need follow up information about today's clinic visit or your schedule please contact [facility] directly at [phone].  Normal or non-critical lab and imaging results will be communicated to you by MyChart, letter or phone within 4 business days after the clinic has received the results. If you do not hear from us within 7 days, please contact the clinic through MyChart or phone. If you have a critical or abnormal lab result, we will notify you by phone as soon as possible.  Submit refill requests through [employer] or call your pharmacy and they will forward the refill request to us. Please allow 3 business days for your refill to be completed.          Additional Information About Your Visit        MyChart Information     [employer] lets you send messages to your doctor, view your test results, renew your prescriptions, schedule appointments and more. To sign up, go to [email or web address] . Click on \"Log in\" on the left side of the screen, which will take you to the Welcome page. Then click on \"Sign up Now\" on the right side of the page.     You will be asked to enter the access code listed below, as well as some personal information. Please follow the directions to create your username and password.   "   Your access code is: [email or web address]  Expires: 2018  5:08 PM     Your access code will  in 90 days. If you need help or a new code, please call your [facility] clinic or [phone].        Care EveryWhere ID     This is your Care EveryWhere ID. This could be used by other organizations to access your [facility] medical records  [id number]        Your Vitals Were     Pulse Temperature Respirations Last Period Pulse Oximetry BMI (Body Mass Index)    82 98.4  F (36.9  C) (Tympanic) 20 (LMP Unknown) 100% 22.77 kg/m2       Blood Pressure from Last 3 Encounters:   18 100/72   18 110/70   17 112/78    Weight from Last 3 Encounters:   18 124 lb 8 oz (56.5 kg)   18 132 lb (59.9 kg)   17 139 lb (63 kg)              We Performed the Following     *UA reflex to Microscopic and Culture ([facility] (except [place] and [place])     Comprehensive metabolic panel     Hemoglobin A1c          Today's Medication Changes          These changes are accurate as of 18 11:29 AM.  If you have any questions, ask your nurse or doctor.               Start taking these medicines.        Dose/Directions    amoxicillin 500 MG capsule   Commonly known as:  AMOXIL   Used for:  Acute recurrent maxillary sinusitis   Started by:  Kameron Tang MD        Dose:  500 mg   Take 1 capsule (500 mg) by mouth 3 times daily   Quantity:  30 capsule   Refills:  0            Where to get your medicines      These medications were sent to [place] [address] [place] [address] [address]     Phone:  [phone]     amoxicillin 500 MG capsule                Primary Care Provider Office Phone # Fax #    Agata Hobson [phone] [address]  [facility] [address]        Equal Access to Services     ANNA ASH AH: Danielle kendrick Sogiovanna, waaxda luqadaha, qaybta ruben bryant  len pantojaalexandro la'aan ah. So [facility] [phone].    ATENCIÓN: Si richarla shannan, tiene a delatorre disposición servicios gratuitos de asistencia lingüística. Jesus mercado [phone].    We comply with applicable federal civil rights laws and [place] laws. We do not discriminate on the basis of race, color, national origin, age, disability, sex, sexual orientation, or gender identity.            Thank you!     Thank you for choosing [facility]  for your care. Our goal is always to provide you with excellent care. Hearing back from our patients is one way we can continue to improve our services. Please take a few minutes to complete the written survey that you may receive in the mail after your visit with us. Thank you!             Your Updated Medication List - Protect others around you: Learn how to safely use, store and throw away your medicines at www.disposemymeds.org.          This list is accurate as of 4/9/18 11:29 AM.  Always use your most recent med list.                   Brand Name Dispense Instructions for use Diagnosis    amoxicillin 500 MG capsule    AMOXIL    30 capsule    Take 1 capsule (500 mg) by mouth 3 times daily    Acute recurrent maxillary sinusitis       aspirin 81 MG tablet     90 tablet    Take 1 tablet (81 mg) by mouth daily    Routine general medical examination at a health care facility       azithromycin 250 MG tablet    ZITHROMAX    6 tablet    Two tablets first day, then one tablet daily for four days    Acute bronchitis, unspecified organism, Acute non-recurrent sinusitis, unspecified location       omeprazole 20 MG tablet     90 tablet    Take 1 tablet (20 mg) by mouth daily Take 30-60 minutes before a meal.    Other acute gastritis with hemorrhage       ONE-A-DAY 50 PLUS Tabs      Take 1 tablet by mouth daily.

## 2018-04-09 NOTE — LETTER
April 9, 2018      Leo Chou  [address] [address]        To Whom It May Concern:    Leo Chou  was seen on April 9, 2018.  Please excuse her  until Wed 4/11/18 due to illness.        Sincerely,        Kameron Tang MD

## 2018-04-10 LAB
ALBUMIN SERPL-MCNC: 4.3 G/DL (ref 3.4–5)
ALP SERPL-CCNC: 54 U/L (ref 40–150)
ALT SERPL W P-5'-P-CCNC: 31 U/L (ref 0–50)
ANION GAP SERPL CALCULATED.3IONS-SCNC: 6 MMOL/L (ref 3–14)
AST SERPL W P-5'-P-CCNC: 27 U/L (ref 0–45)
BILIRUB SERPL-MCNC: 0.4 MG/DL (ref 0.2–1.3)
BUN SERPL-MCNC: 8 MG/DL (ref 7–30)
CALCIUM SERPL-MCNC: 9.4 MG/DL (ref 8.5–10.1)
CHLORIDE SERPL-SCNC: 100 MMOL/L (ref 94–109)
CO2 SERPL-SCNC: 29 MMOL/L (ref 20–32)
CREAT SERPL-MCNC: 0.61 MG/DL (ref 0.52–1.04)
GFR SERPL CREATININE-BSD FRML MDRD: >90 ML/MIN/1.7M2
GLUCOSE SERPL-MCNC: 95 MG/DL (ref 70–99)
POTASSIUM SERPL-SCNC: 3.8 MMOL/L (ref 3.4–5.3)
PROT SERPL-MCNC: 8.3 G/DL (ref 6.8–8.8)
SODIUM SERPL-SCNC: 135 MMOL/L (ref 133–144)

## 2018-07-13 ENCOUNTER — TELEPHONE (OUTPATIENT)
Dept: FAMILY MEDICINE | Facility: CLINIC | Age: 53
End: 2018-07-13

## 2018-07-13 NOTE — TELEPHONE ENCOUNTER
Panel Management Review      Composite cancer screening  Chart review shows that this patient is due/due soon for the following Mammogram  Summary:    Patient is due/failing the following:   MAMMOGRAM    Action needed:   Patient needs office visit for Mammo.    Type of outreach:    Sent letter.    Questions for provider review:    None                                                                                                                                    Yesenia HARRISON

## 2018-07-13 NOTE — LETTER
July 13, 2018    Leo YESSI Effie  [address] [address]    Dear Candy Bailey cares about your health and your health plan.  I have reviewed your medical conditions, medication list and lab results, and am making recommendations based on this review to better manage your health.    You are in particular need of attention regarding:-Breast Cancer Screening  I am recommending that you:     -schedule a MAMMOGRAM which is due.    1 in 8 women will develop invasive breast cancer during her lifetime and it is the most common non-skin cancer in American women.  EARLY detection, new treatments, and a better understanding of the disease have increased survival rates - the 5 year survival rate in the 1960s was 63% and today it is close to 90%.    If you are under/uninsured, we recommend you contact the [facility]. They offer mammograms at no charge or on a sliding fee charge. You can schedule with them at [phone]. Please have them send us the results.      Please disregard this reminder if you have had this exam elsewhere within the last year.  It would be helpful for us to have a copy of your mammogram report in your file so that we can best coordinate your care - please contact us with when your test was done so we can update your record.             Please call us at the [facility] location:  [phone] or use [employer] to address the above recommendations.     Thank you for trusting [facility].  We appreciate the opportunity to serve you and look forward to supporting your healthcare in the future.    If you have (or plan to have) any of these tests done at a facility other than a [facility] or a [facility], please have the results sent to the [facility] location noted above.      Best Regards,    Agata Hobson MD

## 2018-11-23 ENCOUNTER — OFFICE VISIT (OUTPATIENT)
Dept: FAMILY MEDICINE | Facility: CLINIC | Age: 53
End: 2018-11-23
Payer: COMMERCIAL

## 2018-11-23 VITALS
BODY MASS INDEX: 24.29 KG/M2 | OXYGEN SATURATION: 97 % | DIASTOLIC BLOOD PRESSURE: 60 MMHG | SYSTOLIC BLOOD PRESSURE: 102 MMHG | HEART RATE: 67 BPM | HEIGHT: 62 IN | RESPIRATION RATE: 14 BRPM | TEMPERATURE: 97.7 F | WEIGHT: 132 LBS

## 2018-11-23 DIAGNOSIS — N39.3 FEMALE STRESS INCONTINENCE: ICD-10-CM

## 2018-11-23 DIAGNOSIS — Z23 NEED FOR PROPHYLACTIC VACCINATION WITH TETANUS-DIPHTHERIA (TD): ICD-10-CM

## 2018-11-23 DIAGNOSIS — G89.29 CHRONIC BILATERAL LOW BACK PAIN WITHOUT SCIATICA: ICD-10-CM

## 2018-11-23 DIAGNOSIS — Z00.00 ROUTINE GENERAL MEDICAL EXAMINATION AT A HEALTH CARE FACILITY: Primary | ICD-10-CM

## 2018-11-23 DIAGNOSIS — R73.01 IMPAIRED FASTING GLUCOSE: ICD-10-CM

## 2018-11-23 DIAGNOSIS — N95.9 MENOPAUSAL AND POSTMENOPAUSAL DISORDER: ICD-10-CM

## 2018-11-23 DIAGNOSIS — Z23 NEED FOR VACCINATION: ICD-10-CM

## 2018-11-23 DIAGNOSIS — M54.50 CHRONIC BILATERAL LOW BACK PAIN WITHOUT SCIATICA: ICD-10-CM

## 2018-11-23 LAB
ALBUMIN UR-MCNC: NEGATIVE MG/DL
APPEARANCE UR: CLEAR
BILIRUB UR QL STRIP: NEGATIVE
COLOR UR AUTO: YELLOW
GLUCOSE UR STRIP-MCNC: NEGATIVE MG/DL
HGB UR QL STRIP: ABNORMAL
KETONES UR STRIP-MCNC: NEGATIVE MG/DL
LEUKOCYTE ESTERASE UR QL STRIP: NEGATIVE
NITRATE UR QL: NEGATIVE
NON-SQ EPI CELLS #/AREA URNS LPF: ABNORMAL /LPF
PH UR STRIP: 5.5 PH (ref 5–7)
RBC #/AREA URNS AUTO: ABNORMAL /HPF
SOURCE: ABNORMAL
SP GR UR STRIP: >1.03 (ref 1–1.03)
UROBILINOGEN UR STRIP-ACNC: 0.2 EU/DL (ref 0.2–1)
WBC #/AREA URNS AUTO: ABNORMAL /HPF

## 2018-11-23 PROCEDURE — 99213 OFFICE O/P EST LOW 20 MIN: CPT | Mod: 25 | Performed by: FAMILY MEDICINE

## 2018-11-23 PROCEDURE — 81001 URINALYSIS AUTO W/SCOPE: CPT | Performed by: FAMILY MEDICINE

## 2018-11-23 PROCEDURE — 99396 PREV VISIT EST AGE 40-64: CPT | Mod: 25 | Performed by: FAMILY MEDICINE

## 2018-11-23 PROCEDURE — 90714 TD VACC NO PRESV 7 YRS+ IM: CPT | Performed by: FAMILY MEDICINE

## 2018-11-23 PROCEDURE — 90750 HZV VACC RECOMBINANT IM: CPT | Performed by: FAMILY MEDICINE

## 2018-11-23 PROCEDURE — 90472 IMMUNIZATION ADMIN EACH ADD: CPT | Performed by: FAMILY MEDICINE

## 2018-11-23 PROCEDURE — 90471 IMMUNIZATION ADMIN: CPT | Performed by: FAMILY MEDICINE

## 2018-11-23 ASSESSMENT — ANXIETY QUESTIONNAIRES
IF YOU CHECKED OFF ANY PROBLEMS ON THIS QUESTIONNAIRE, HOW DIFFICULT HAVE THESE PROBLEMS MADE IT FOR YOU TO DO YOUR WORK, TAKE CARE OF THINGS AT HOME, OR GET ALONG WITH OTHER PEOPLE: NOT DIFFICULT AT ALL
5. BEING SO RESTLESS THAT IT IS HARD TO SIT STILL: NOT AT ALL
3. WORRYING TOO MUCH ABOUT DIFFERENT THINGS: NOT AT ALL
1. FEELING NERVOUS, ANXIOUS, OR ON EDGE: NOT AT ALL
7. FEELING AFRAID AS IF SOMETHING AWFUL MIGHT HAPPEN: NOT AT ALL
2. NOT BEING ABLE TO STOP OR CONTROL WORRYING: NOT AT ALL
6. BECOMING EASILY ANNOYED OR IRRITABLE: NOT AT ALL
GAD7 TOTAL SCORE: 0

## 2018-11-23 ASSESSMENT — PATIENT HEALTH QUESTIONNAIRE - PHQ9
5. POOR APPETITE OR OVEREATING: NOT AT ALL
SUM OF ALL RESPONSES TO PHQ QUESTIONS 1-9: 5

## 2018-11-23 NOTE — NURSING NOTE
"Chief Complaint   Patient presents with     Physical     /60  Pulse 67  Temp 97.7  F (36.5  C) (Tympanic)  Resp 14  Ht 5' 2\" (1.575 m)  Wt 132 lb (59.9 kg)  LMP  (LMP Unknown)  SpO2 97%  Breastfeeding? No  BMI 24.14 kg/m2 Estimated body mass index is 24.14 kg/(m^2) as calculated from the following:    Height as of this encounter: 5' 2\" (1.575 m).    Weight as of this encounter: 132 lb (59.9 kg).  BP completed using cuff size: regular   Marii Mccarthy CMA    Health Maintenance Due   Topic Date Due     HIV SCREEN (SYSTEM ASSIGNED)  05/18/1981     MAMMO SCREEN Q2 YR (SYSTEM ASSIGNED)  07/31/2017     TETANUS IMMUNIZATION (SYSTEM ASSIGNED)  11/01/2017     PHQ-2 Q1 YR  05/05/2018     ADVANCE DIRECTIVE PLANNING Q5 YRS  05/18/2018     Health Maintenance reviewed at today's visit patient asked to schedule/complete:   Breast Cancer:  Patient agrees to schedule  Immunizations:  Patient agrees to schedule    "

## 2018-11-23 NOTE — MR AVS SNAPSHOT
After Visit Summary   11/23/2018    Leo Chou    MRN: [id number]           Patient Information     Date Of Birth          1965        Visit Information        Provider Department      11/23/2018 10:00 AM Agata Hobson MD [facility]        Today's Diagnoses     Routine general medical examination at a health care facility    -  1    Urinary tract infection        Need for prophylactic vaccination with tetanus-diphtheria (Td)        Menopausal and postmenopausal disorder since 2012        Chronic bilateral low back pain without sciatica since 2012          Care Instructions      Preventive Health Recommendations  Female Ages 50 - 64    Yearly exam: See your health care provider every year in order to  o Review health changes.   o Discuss preventive care.    o Review your medicines if your doctor has prescribed any.      Get a Pap test every three years (unless you have an abnormal result and your provider advises testing more often).    If you get Pap tests with HPV test, you only need to test every 5 years, unless you have an abnormal result.     You do not need a Pap test if your uterus was removed (hysterectomy) and you have not had cancer.    You should be tested each year for STDs (sexually transmitted diseases) if you're at risk.     Have a mammogram every 1 to 2 years.    Have a colonoscopy at age 50, or have a yearly FIT test (stool test). These exams screen for colon cancer.      Have a cholesterol test every 5 years, or more often if advised.    Have a diabetes test (fasting glucose) every three years. If you are at risk for diabetes, you should have this test more often.     If you are at risk for osteoporosis (brittle bone disease), think about having a bone density scan (DEXA).    Shots: Get a flu shot each year. Get a tetanus shot every 10 years.    Nutrition:     Eat at least 5 servings of fruits and vegetables each day.    Eat  whole-grain bread, whole-wheat pasta and brown rice instead of white grains and rice.    Get adequate Calcium and Vitamin D.     Lifestyle    Exercise at least 150 minutes a week (30 minutes a day, 5 days a week). This will help you control your weight and prevent disease.    Limit alcohol to one drink per day.    No smoking.     Wear sunscreen to prevent skin cancer.     See your dentist every six months for an exam and cleaning.    See your eye doctor every 1 to 2 years.    1. . Schedule your mammo at [facility]  At [address] at [phone]      2. Please do your breast exam every mo, when you  Change the  calendar page or set an alarm on your cell phone Do a  visual check for dimples, inversion or indentation or any different position of the nipple Feel manually  for any 1cm or larger  size mass ie about the size of an almond Be sure to cover the entire area of both breasts : this extends back to the back on either side and from the collar bone to the bottom of the breasts where you can begin to feel ribs.  Men are advised to do this exam also as they now have a higher rate of breast cancer , like women do .     3. Shingrex is a 2 shot series that prevents shingles 97% of the time, as opposed to the old shingles shot that only prevented it at 40-50%  It costs less for medicare at a pharmacy  You should get it starting at 50 yrs old get the 2nd shot 5-6 mo after the first one    YOU got the 1st one today and the last one is due in May or later @@@@@          Follow-ups after your visit        Who to contact     If you have questions or need follow up information about today's clinic visit or your schedule please contact [facility] directly at [phone].  Normal or non-critical lab and imaging results will be communicated to you by MyChart, letter or phone within 4 business days after the clinic has received the results. If you do not hear from us within 7 days,  "please contact the clinic through [employer] or phone. If you have a critical or abnormal lab result, we will notify you by phone as soon as possible.  Submit refill requests through [employer] or call your pharmacy and they will forward the refill request to us. Please allow 3 business days for your refill to be completed.          Additional Information About Your Visit        Care EveryWhere ID     This is your Care EveryWhere ID. This could be used by other organizations to access your [facility] medical records  [id number]        Your Vitals Were     Pulse Temperature Respirations Height Last Period Pulse Oximetry    67 97.7  F (36.5  C) (Tympanic) 14 5' 2\" (1.575 m) (LMP Unknown) 97%    Breastfeeding? BMI (Body Mass Index)                No 24.14 kg/m2           Blood Pressure from Last 3 Encounters:   11/23/18 102/60   04/09/18 100/72   01/12/18 110/70    Weight from Last 3 Encounters:   11/23/18 132 lb (59.9 kg)   04/09/18 124 lb 8 oz (56.5 kg)   01/12/18 132 lb (59.9 kg)              We Performed the Following     *UA reflex to Microscopic and Culture ([place] [facility] (except [place] and [place])     DEPRESSION ACTION PLAN (DAP)     Urine Microscopic          Today's Medication Changes          These changes are accurate as of 11/23/18 10:59 AM.  If you have any questions, ask your nurse or doctor.               Stop taking these medicines if you haven't already. Please contact your care team if you have questions.     omeprazole 20 MG tablet   Stopped by:  Agata Hobson MD                    Primary Care Provider Office Phone # Fax #    Agata Hobson [phone] [address] [facility] [address]        Equal Access to Services     [facility] NILSA : Danielle Caraballo, pato mix, qashaun kalyndsey rendon, ruben campo. So [facility] [phone].    ATENCIÓN: Si habla español, tiene a delatorre disposición " servicios gratuitos de asistencia lingüística. Jesus mercado [phone].    We comply with applicable federal civil rights laws and [place] laws. We do not discriminate on the basis of race, color, national origin, age, disability, sex, sexual orientation, or gender identity.            Thank you!     Thank you for choosing [facility]  for your care. Our goal is always to provide you with excellent care. Hearing back from our patients is one way we can continue to improve our services. Please take a few minutes to complete the written survey that you may receive in the mail after your visit with us. Thank you!             Your Updated Medication List - Protect others around you: Learn how to safely use, store and throw away your medicines at www.disposemymeds.org.          This list is accurate as of 11/23/18 10:59 AM.  Always use your most recent med list.                   Brand Name Dispense Instructions for use Diagnosis    aspirin 81 MG tablet    ASA    90 tablet    Take 1 tablet (81 mg) by mouth daily    Routine general medical examination at a health care facility       FISH OIL + D3 PO           ONE-A-DAY 50 PLUS Tabs      Take 1 tablet by mouth daily.        VITAMIN D (CHOLECALCIFEROL) PO      Take 1,000 Units by mouth daily

## 2018-11-23 NOTE — PATIENT INSTRUCTIONS
Preventive Health Recommendations  Female Ages 50 - 64    Yearly exam: See your health care provider every year in order to  o Review health changes.   o Discuss preventive care.    o Review your medicines if your doctor has prescribed any.      Get a Pap test every three years (unless you have an abnormal result and your provider advises testing more often).    If you get Pap tests with HPV test, you only need to test every 5 years, unless you have an abnormal result.     You do not need a Pap test if your uterus was removed (hysterectomy) and you have not had cancer.    You should be tested each year for STDs (sexually transmitted diseases) if you're at risk.     Have a mammogram every 1 to 2 years.    Have a colonoscopy at age 50, or have a yearly FIT test (stool test). These exams screen for colon cancer.      Have a cholesterol test every 5 years, or more often if advised.    Have a diabetes test (fasting glucose) every three years. If you are at risk for diabetes, you should have this test more often.     If you are at risk for osteoporosis (brittle bone disease), think about having a bone density scan (DEXA).    Shots: Get a flu shot each year. Get a tetanus shot every 10 years.    Nutrition:     Eat at least 5 servings of fruits and vegetables each day.    Eat whole-grain bread, whole-wheat pasta and brown rice instead of white grains and rice.    Get adequate Calcium and Vitamin D.     Lifestyle    Exercise at least 150 minutes a week (30 minutes a day, 5 days a week). This will help you control your weight and prevent disease.    Limit alcohol to one drink per day.    No smoking.     Wear sunscreen to prevent skin cancer.     See your dentist every six months for an exam and cleaning.    See your eye doctor every 1 to 2 years.    1. . Schedule your mammo at [facility]  At [address] at [phone]      2. Please do your breast exam every mo, when you  Change the  calendar page or set an  alarm on your cell phone Do a  visual check for dimples, inversion or indentation or any different position of the nipple Feel manually  for any 1cm or larger  size mass ie about the size of an almond Be sure to cover the entire area of both breasts : this extends back to the back on either side and from the collar bone to the bottom of the breasts where you can begin to feel ribs.  Men are advised to do this exam also as they now have a higher rate of breast cancer , like women do .     3. Shingrex is a 2 shot series that prevents shingles 97% of the time, as opposed to the old shingles shot that only prevented it at 40-50%  It costs less for medicare at a pharmacy  You should get it starting at 50 yrs old get the 2nd shot 5-6 mo after the first one    YOU got the 1st one today and the last one is due in May or later @@@@@

## 2018-11-23 NOTE — PROGRESS NOTES
SUBJECTIVE:   CC: Leo Chou is an 53 year old woman who presents for preventive health visit.     Physical   Annual:     Getting at least 3 servings of Calcium per day:  Yes    Bi-annual eye exam:  NO    Dental care twice a year:  Yes    Sleep apnea or symptoms of sleep apnea:  None    Diet:  Regular (no restrictions)    Frequency of exercise:  None    Taking medications regularly:  Yes    Medication side effects:  None    Additional concerns today:  YES    URINARY TRACT SYMPTOMS      Duration: x 2-3 months- worse but present since 2012  With onset of menopause     Description  frequency and back pain    Intensity:  severe    Accompanying signs and symptoms:  Fever/chills: no   Flank pain no   Nausea and vomiting: no   Vaginal symptoms: none  Abdominal/Pelvic Pain: no     History  History of frequent UTI's: YES  History of kidney stones: no   Sexually Active: no   Possibility of pregnancy: No    Precipitating or alleviating factors: None    Therapies tried and outcome: none   Outcome: N/A    BIlat Low Back Pain       Duration: since 2012         Specific cause: lifting    Description:   Location of pain: low back bilateral  Character of pain: dull ache  Pain radiation:none  New numbness or weakness in legs, not attributed to pain:  no     Intensity: Currently 1/10    History:   Pain interferes with job: No  History of back problems: no prior back problems  Any previous MRI or X-rays: None  Sees a specialist for back pain:  No  Therapies tried without relief: 0    Alleviating factors:   Improved by: 0      Precipitating factors:  Worsened by: Lifting and Bending      Accompanying Signs & Symptoms:  Risk of Fracture:  None  Risk of Cauda Equina:  None  Risk of Infection:  None  Risk of Cancer:  None  Risk of Ankylosing Spondylitis:  Onset at age <35, male, AND morning back stiffness. no           Today's PHQ-2 Score:   PHQ-2 ( 1999 Pfizer) 5/5/2017   Q1: Little interest or pleasure in doing things 0   Q2:  Feeling down, depressed or hopeless 0   PHQ-2 Score 0       Abuse: Current or Past(Physical, Sexual or Emotional)- No  Do you feel safe in your environment - Yes    Social History   Substance Use Topics     Smoking status: Never Smoker     Smokeless tobacco: Never Used     Alcohol use No     No flowsheet data found.No flowsheet data found.    Reviewed orders with patient.  Reviewed health maintenance and updated orders accordingly - Yes  Recent Labs   Lab Test  04/09/18   1127  01/12/18   1518  12/22/16   1034  10/30/13   1634   10/12/12   1433  06/16/11   1325   A1C  5.4   --    --    --    --    --    --    LDL   --   81   --    --    --   105  80.0   HDL   --   63   --    --    --    --   61*   TRIG   --   152*   --    --    --    --   65   ALT  31   --   38  28   < >   --    --    CR  0.61   --   0.61  0.70   --    --    --    GFRESTIMATED  >90   --   >90  89   < >   --    --    GFRESTBLACK  >90   --   >90  >90   < >   --    --    POTASSIUM  3.8   --   4.1  4.2   --    --    --     < > = values in this interval not displayed.        Patient over age 50, mutual decision to screen reflected in health maintenance.    Pertinent mammograms are reviewed under the imaging tab.  History of abnormal Pap smear: NO - age 30- 65 PAP every 3 years recommended  PAP / HPV Latest Ref Rng & Units 12/22/2016 10/17/2013 10/12/2012   PAP - NIL NIL NIL   HPV 16 DNA NEG Negative - -   HPV 18 DNA NEG Negative - -   OTHER HR HPV NEG Negative - -     Reviewed and updated as needed this visit by clinical staff  Tobacco  Allergies  Meds  Problems  Med Hx  Surg Hx  Fam Hx  Soc Hx          Reviewed and updated as needed this visit by Provider  Allergies  Meds  Problems            Review of Systems  CONSTITUTIONAL: NEGATIVE for fever, chills, change in weight  INTEGUMENTARY/SKIN: NEGATIVE for worrisome rashes, moles or lesions  EYES: NEGATIVE for vision changes or irritation  ENT: NEGATIVE for ear, mouth and throat problems  RESP:  "NEGATIVE for significant cough or SOB  BREAST: NEGATIVE for masses, tenderness or discharge  CV: NEGATIVE for chest pain, palpitations or peripheral edema  GI: NEGATIVE for nausea, abdominal pain, heartburn, or change in bowel habits   menopausal female: frequency, urgency, incontinence-urge and incontinence-stress  MUSCULOSKELETAL:POSITIVE  for  and back pain  NEURO: NEGATIVE for weakness, dizziness or paresthesias  PSYCHIATRIC: NEGATIVE for changes in mood or affect      OBJECTIVE:   /60  Pulse 67  Temp 97.7  F (36.5  C) (Tympanic)  Resp 14  Ht 5' 2\" (1.575 m)  Wt 132 lb (59.9 kg)  LMP  (LMP Unknown)  SpO2 97%  Breastfeeding? No  BMI 24.14 kg/m2  Physical Exam  GENERAL APPEARANCE: healthy, alert and no distress  EYES: Eyes grossly normal to inspection, PERRL and conjunctivae and sclerae normal  NECK: no adenopathy, no asymmetry, masses, or scars and thyroid normal to palpation  RESP: lungs clear to auscultation - no rales, rhonchi or wheezes  BREAST: normal without masses, tenderness or nipple discharge and no palpable axillary masses or adenopathy  CV: regular rate and rhythm, normal S1 S2, no S3 or S4, no murmur, click or rub, no peripheral edema and peripheral pulses strong  ABDOMEN: soft, nontender, no hepatosplenomegaly, no masses and bowel sounds normal  MS: no musculoskeletal defects are noted and gait is age appropriate without ataxia  SKIN: no suspicious lesions or rashes  NEURO: Normal strength and tone, sensory exam grossly normal, mentation intact and speech normal  PSYCH: mentation appears normal and affect normal/bright  LYMPH: Negative CCOA nodes and thyroid and inguinal nodes       Diagnostic Test Results:  Results for orders placed or performed in visit on 11/23/18   *UA reflex to Microscopic and Culture ([place] [facility] (except [place] and [place])   Result Value Ref Range    Color Urine Yellow     Appearance Urine Clear     Glucose Urine Negative NEG^Negative " "mg/dL    Bilirubin Urine Negative NEG^Negative    Ketones Urine Negative NEG^Negative mg/dL    Specific Gravity Urine >1.030 1.003 - 1.035    Blood Urine Moderate (A) NEG^Negative    pH Urine 5.5 5.0 - 7.0 pH    Protein Albumin Urine Negative NEG^Negative mg/dL    Urobilinogen Urine 0.2 0.2 - 1.0 EU/dL    Nitrite Urine Negative NEG^Negative    Leukocyte Esterase Urine Negative NEG^Negative    Source Midstream Urine    Urine Microscopic   Result Value Ref Range    WBC Urine 0 - 5 OTO5^0 - 5 /HPF    RBC Urine 2-5 (A) OTO2^O - 2 /HPF    Squamous Epithelial /LPF Urine Few FEW^Few /LPF       ASSESSMENT/PLAN:       ICD-10-CM    1. Routine general medical examination at a health care facility Z00.00 Urine Microscopic   2. Menopausal and postmenopausal disorder since 2012 N95.9 OFFICE/OUTPT VISIT,EST,LEVL III   3. Chronic bilateral low back pain without sciatica since 2012 M54.5 OFFICE/OUTPT VISIT,EST,LEVL III    G89.29    4. Female stress incontinence N39.3 OFFICE/OUTPT VISIT,EST,LEVL III   5. Impaired fasting glucose R73.01    6. Need for vaccination Z23 Each additional admin.  (Right click and add QUANTITY)  [19560]     SHINGRIX [76937]   7. Need for prophylactic vaccination with tetanus-diphtheria (Td) Z23 TD PRSERV FREE >=7 YRS ADS IM [17040]     1st  Administration  [[id number]]       COUNSELING:  Reviewed preventive health counseling, as reflected in patient instructions       Regular exercise       Healthy diet/nutrition       Vision screening    BP Readings from Last 1 Encounters:   11/23/18 102/60     Estimated body mass index is 24.14 kg/(m^2) as calculated from the following:    Height as of this encounter: 5' 2\" (1.575 m).    Weight as of this encounter: 132 lb (59.9 kg).           reports that she has never smoked. She has never used smokeless tobacco.    Patient Instructions     Preventive Health Recommendations  Female Ages 50 - 64    Yearly exam: See your health care provider every year in order to  o Review " health changes.   o Discuss preventive care.    o Review your medicines if your doctor has prescribed any.      Get a Pap test every three years (unless you have an abnormal result and your provider advises testing more often).    If you get Pap tests with HPV test, you only need to test every 5 years, unless you have an abnormal result.     You do not need a Pap test if your uterus was removed (hysterectomy) and you have not had cancer.    You should be tested each year for STDs (sexually transmitted diseases) if you're at risk.     Have a mammogram every 1 to 2 years.    Have a colonoscopy at age 50, or have a yearly FIT test (stool test). These exams screen for colon cancer.      Have a cholesterol test every 5 years, or more often if advised.    Have a diabetes test (fasting glucose) every three years. If you are at risk for diabetes, you should have this test more often.     If you are at risk for osteoporosis (brittle bone disease), think about having a bone density scan (DEXA).    Shots: Get a flu shot each year. Get a tetanus shot every 10 years.    Nutrition:     Eat at least 5 servings of fruits and vegetables each day.    Eat whole-grain bread, whole-wheat pasta and brown rice instead of white grains and rice.    Get adequate Calcium and Vitamin D.     Lifestyle    Exercise at least 150 minutes a week (30 minutes a day, 5 days a week). This will help you control your weight and prevent disease.    Limit alcohol to one drink per day.    No smoking.     Wear sunscreen to prevent skin cancer.     See your dentist every six months for an exam and cleaning.    See your eye doctor every 1 to 2 years.    1. . Schedule your mammo at [facility]  At [address] at [phone]      2. Please do your breast exam every mo, when you  Change the  calendar page or set an alarm on your cell phone Do a  visual check for dimples, inversion or indentation or any different position of the nipple Feel manually   for any 1cm or larger  size mass ie about the size of an almond Be sure to cover the entire area of both breasts : this extends back to the back on either side and from the collar bone to the bottom of the breasts where you can begin to feel ribs.  Men are advised to do this exam also as they now have a higher rate of breast cancer , like women do .     3. Shingrex is a 2 shot series that prevents shingles 97% of the time, as opposed to the old shingles shot that only prevented it at 40-50%  It costs less for medicare at a pharmacy  You should get it starting at 50 yrs old get the 2nd shot 5-6 mo after the first one    YOU got the 1st one today and the last one is due in May or later @@@@@        Counseling Resources:  ATP IV Guidelines  Pooled Cohorts Equation Calculator  Breast Cancer Risk Calculator  FRAX Risk Assessment  ICSI Preventive Guidelines  Dietary Guidelines for Americans, 2010  USDA's MyPlate  ASA Prophylaxis  Lung CA Screening    Time spent with the patient 26mins, more than 50% in counseling and coordinating care, Rethe fact that the U/A is repeatredly neg, and the incontinence and LBP are from menopausal loosening of tissues and her recurrent LBP --both present since 2012       Agata Hobson MD  [facility]

## 2018-11-23 NOTE — LETTER
My Depression Action Plan  Name: Leo Chou   Date of Birth 1965  Date: 11/23/2018    My doctor: Agata Hobson   My clinic: [address] [address]  [phone]          GREEN    ZONE   Good Control    What it looks like:     Things are going generally well. You have normal up s and down s. You may even feel depressed from time to time, but bad moods usually last less than a day.   What you need to do:  1. Continue to care for yourself (see self care plan)  2. Check your depression survival kit and update it as needed  3. Follow your physician s recommendations including any medication.  4. Do not stop taking medication unless you consult with your physician first.           YELLOW         ZONE Getting Worse    What it looks like:     Depression is starting to interfere with your life.     It may be hard to get out of bed; you may be starting to isolate yourself from others.    Symptoms of depression are starting to last most all day and this has happened for several days.     You may have suicidal thoughts but they are not constant.   What you need to do:     1. Call your care team, your response to treatment will improve if you keep your care team informed of your progress. Yellow periods are signs an adjustment may need to be made.     2. Continue your self-care, even if you have to fake it!    3. Talk to someone in your support network    4. Open up your depression survival kit           RED    ZONE Medical Alert - Get Help    What it looks like:     Depression is seriously interfering with your life.     You may experience these or other symptoms: You can t get out of bed most days, can t work or engage in other necessary activities, you have trouble taking care of basic hygiene, or basic responsibilities, thoughts of suicide or death that will not go away, self-injurious behavior.     What  you need to do:  1. Call your care team and request a same-day appointment. If they are not available (weekends or after hours) call your local crisis line, emergency room or 911.            Depression Self Care Plan / Survival Kit    Self-Care for Depression  Here s the deal. Your body and mind are really not as separate as most people think.  What you do and think affects how you feel and how you feel influences what you do and think. This means if you do things that people who feel good do, it will help you feel better.  Sometimes this is all it takes.  There is also a place for medication and therapy depending on how severe your depression is, so be sure to consult with your medical provider and/ or Behavioral Health Consultant if your symptoms are worsening or not improving.     In order to better manage my stress, I will:    Exercise  Get some form of exercise, every day. This will help reduce pain and release endorphins, the  feel good  chemicals in your brain. This is almost as good as taking antidepressants!  This is not the same as joining a gym and then never going! (they count on that by the way ) It can be as simple as just going for a walk or doing some gardening, anything that will get you moving.      Hygiene   Maintain good hygiene (Get out of bed in the morning, Make your bed, Brush your teeth, Take a shower, and Get dressed like you were going to work, even if you are unemployed).  If your clothes don't fit try to get ones that do.    Diet  I will strive to eat foods that are good for me, drink plenty of water, and avoid excessive sugar, caffeine, alcohol, and other mood-altering substances.  Some foods that are helpful in depression are: complex carbohydrates, B vitamins, flaxseed, fish or fish oil, fresh fruits and vegetables.    Psychotherapy  I agree to participate in Individual Therapy (if recommended).    Medication  If prescribed medications, I agree to take them.  Missing doses can result  in serious side effects.  I understand that drinking alcohol, or other illicit drug use, may cause potential side effects.  I will not stop my medication abruptly without first discussing it with my provider.    Staying Connected With Others  I will stay in touch with my friends, family members, and my primary care provider/team.    Use your imagination  Be creative.  We all have a creative side; it doesn t matter if it s oil painting, sand castles, or mud pies! This will also kick up the endorphins.    Witness Beauty  (AKA stop and smell the roses) Take a look outside, even in mid-winter. Notice colors, textures. Watch the squirrels and birds.     Service to others  Be of service to others.  There is always someone else in need.  By helping others we can  get out of ourselves  and remember the really important things.  This also provides opportunities for practicing all the other parts of the program.    Humor  Laugh and be silly!  Adjust your TV habits for less news and crime-drama and more comedy.    Control your stress  Try breathing deep, massage therapy, biofeedback, and meditation. Find time to relax each day.     My support system    Clinic Contact:  Phone number:    Contact 1:  Phone number:    Contact 2:  Phone number:    Denominational/:  Phone number:    Therapist:  Phone number:    Local crisis center:    Phone number:    Other community support:  Phone number:

## 2018-11-24 ASSESSMENT — ANXIETY QUESTIONNAIRES: GAD7 TOTAL SCORE: 0

## 2018-11-27 ENCOUNTER — TELEPHONE (OUTPATIENT)
Dept: FAMILY MEDICINE | Facility: CLINIC | Age: 53
End: 2018-11-27

## 2018-11-27 NOTE — LETTER
November 27, 2018    Leo YESSI Botelloen  [address] [address]    Dear Candy Bailey cares about your health and your health plan.  I have reviewed your medical conditions, medication list and lab results, and am making recommendations based on this review to better manage your health.    You are in particular need of attention regarding:  -Breast Cancer Screening    I am recommending that you:     -schedule a MAMMOGRAM which is due.    1 in 8 women will develop invasive breast cancer during her lifetime and it is the most common non-skin cancer in American women.  EARLY detection, new treatments, and a better understanding of the disease have increased survival rates - the 5 year survival rate in the 1960s was 63% and today it is close to 90%.    If you are under/uninsured, we recommend you contact the [facility]. They offer mammograms at no charge or on a sliding fee charge. You can schedule with them at [phone]. Please have them send us the results.      Please disregard this reminder if you have had this exam elsewhere within the last year.  It would be helpful for us to have a copy of your mammogram report in your file so that we can best coordinate your care - please contact us with when your test was done so we can update your record.               Please call us at the [facility] location:  [phone] or use [employer] to address the above recommendations.     Thank you for trusting [facility].  We appreciate the opportunity to serve you and look forward to supporting your healthcare in the future.    If you have (or plan to have) any of these tests done at a facility other than a [facility] or a [facility], please have the results sent to the [facility] location noted above.      Best Regards,    Agata Hobson MD

## 2018-11-27 NOTE — TELEPHONE ENCOUNTER
Panel Management Review      Patient has the following on her problem list: None      Composite cancer screening  Chart review shows that this patient is due/due soon for the following Mammogram  Summary:    Patient is due/failing the following:   MAMMOGRAM    Action needed:       Type of outreach:    Sent letter.    Questions for provider review:    None                                                                                                                                    Marii Mccarthy CMA       Chart routed to  .

## 2019-01-25 ENCOUNTER — HOSPITAL ENCOUNTER (OUTPATIENT)
Dept: MAMMOGRAPHY | Facility: CLINIC | Age: 54
Discharge: HOME | End: 2019-01-25
Attending: FAMILY MEDICINE | Admitting: FAMILY MEDICINE
Payer: COMMERCIAL

## 2019-01-25 DIAGNOSIS — Z12.31 VISIT FOR SCREENING MAMMOGRAM: ICD-10-CM

## 2019-01-25 PROCEDURE — 77063 BREAST TOMOSYNTHESIS BI: CPT

## 2019-02-27 ENCOUNTER — TELEPHONE (OUTPATIENT)
Dept: FAMILY MEDICINE | Facility: CLINIC | Age: 54
End: 2019-02-27

## 2019-02-27 NOTE — TELEPHONE ENCOUNTER
Panel Management Review      Patient has the following on her problem list:     Depression / Dysthymia review    Measure:  Needs PHQ-9 score of 4 or less during index window.  Administer PHQ-9 and if score is 5 or more, send encounter to provider for next steps.    5 - 7 month window range:     PHQ-9 SCORE 12/18/2015 11/23/2018   PHQ-9 Total Score 2 5       If PHQ-9 recheck is 5 or more, route to provider for next steps.    Patient is due for:  PHQ9      Composite cancer screening  Chart review shows that this patient is due/due soon for the following   Summary:    Patient is due/failing the following:   PHQ9    Action needed:   Patient needs to do PHQ9.    Type of outreach:    Sent letter.    Questions for provider review:    None                                                                                                                                    Marii Mccarthy CMA       Chart routed to  .

## 2019-02-27 NOTE — LETTER
February 27, 2019    Leo Chou  [address] [address]    Dear Candy Bailey cares about your health and your health plan.  I have reviewed your medical conditions, medication list and lab results, and am making recommendations based on this review to better manage your health.    You are in particular need of attention regarding:  -Depression/Anxiety    I am recommending that you:     Please complete the enclosed PHQ9 and mail back to clinic in the envelope provided.         Please call us at the [place] location:  [phone] or use [employer] to address the above recommendations.     Thank you for trusting [facility].  We appreciate the opportunity to serve you and look forward to supporting your healthcare in the future.    If you have (or plan to have) any of these tests done at a facility other than a [facility]] or a [facility], please have the results sent to the [facility] location noted above.      Best Regards,    Agata Hobson MD

## 2019-02-28 ENCOUNTER — TELEPHONE (OUTPATIENT)
Dept: FAMILY MEDICINE | Facility: CLINIC | Age: 54
End: 2019-02-28

## 2019-02-28 ENCOUNTER — OFFICE VISIT (OUTPATIENT)
Dept: FAMILY MEDICINE | Facility: CLINIC | Age: 54
End: 2019-02-28
Payer: COMMERCIAL

## 2019-02-28 VITALS
DIASTOLIC BLOOD PRESSURE: 70 MMHG | TEMPERATURE: 98.2 F | WEIGHT: 133 LBS | BODY MASS INDEX: 23.57 KG/M2 | SYSTOLIC BLOOD PRESSURE: 120 MMHG | RESPIRATION RATE: 14 BRPM | OXYGEN SATURATION: 97 % | HEART RATE: 71 BPM | HEIGHT: 63 IN

## 2019-02-28 DIAGNOSIS — N30.00 ACUTE CYSTITIS WITHOUT HEMATURIA: Primary | ICD-10-CM

## 2019-02-28 DIAGNOSIS — E66.3 OVERWEIGHT (BMI 25.0-29.9): ICD-10-CM

## 2019-02-28 DIAGNOSIS — N39.3 FEMALE STRESS INCONTINENCE: ICD-10-CM

## 2019-02-28 DIAGNOSIS — Z71.84 COUNSELING ABOUT TRAVEL: ICD-10-CM

## 2019-02-28 DIAGNOSIS — A09 TRAVELER'S DIARRHEA: ICD-10-CM

## 2019-02-28 DIAGNOSIS — R73.01 IMPAIRED FASTING GLUCOSE: ICD-10-CM

## 2019-02-28 LAB
ALBUMIN UR-MCNC: NEGATIVE MG/DL
APPEARANCE UR: CLEAR
BILIRUB UR QL STRIP: NEGATIVE
COLOR UR AUTO: YELLOW
GLUCOSE UR STRIP-MCNC: NEGATIVE MG/DL
HGB UR QL STRIP: ABNORMAL
KETONES UR STRIP-MCNC: NEGATIVE MG/DL
LEUKOCYTE ESTERASE UR QL STRIP: NEGATIVE
NITRATE UR QL: NEGATIVE
NON-SQ EPI CELLS #/AREA URNS LPF: NORMAL /LPF
PH UR STRIP: 5.5 PH (ref 5–7)
RBC #/AREA URNS AUTO: NORMAL /HPF
SOURCE: ABNORMAL
SP GR UR STRIP: 1.02 (ref 1–1.03)
UROBILINOGEN UR STRIP-ACNC: 0.2 EU/DL (ref 0.2–1)
WBC #/AREA URNS AUTO: NORMAL /HPF

## 2019-02-28 PROCEDURE — 81001 URINALYSIS AUTO W/SCOPE: CPT | Performed by: FAMILY MEDICINE

## 2019-02-28 PROCEDURE — 99214 OFFICE O/P EST MOD 30 MIN: CPT | Performed by: FAMILY MEDICINE

## 2019-02-28 RX ORDER — CIPROFLOXACIN 250 MG/1
250 TABLET, FILM COATED ORAL 2 TIMES DAILY
Qty: 6 TABLET | Refills: 0 | Status: SHIPPED | OUTPATIENT
Start: 2019-02-28 | End: 2023-03-26

## 2019-02-28 RX ORDER — AZITHROMYCIN 500 MG/1
1000 TABLET, FILM COATED ORAL DAILY
Qty: 2 TABLET | Refills: 0 | Status: SHIPPED | OUTPATIENT
Start: 2019-02-28 | End: 2019-03-01

## 2019-02-28 ASSESSMENT — ANXIETY QUESTIONNAIRES
7. FEELING AFRAID AS IF SOMETHING AWFUL MIGHT HAPPEN: NEARLY EVERY DAY
3. WORRYING TOO MUCH ABOUT DIFFERENT THINGS: MORE THAN HALF THE DAYS
GAD7 TOTAL SCORE: 11
2. NOT BEING ABLE TO STOP OR CONTROL WORRYING: MORE THAN HALF THE DAYS
1. FEELING NERVOUS, ANXIOUS, OR ON EDGE: MORE THAN HALF THE DAYS
5. BEING SO RESTLESS THAT IT IS HARD TO SIT STILL: NOT AT ALL
IF YOU CHECKED OFF ANY PROBLEMS ON THIS QUESTIONNAIRE, HOW DIFFICULT HAVE THESE PROBLEMS MADE IT FOR YOU TO DO YOUR WORK, TAKE CARE OF THINGS AT HOME, OR GET ALONG WITH OTHER PEOPLE: NOT DIFFICULT AT ALL
6. BECOMING EASILY ANNOYED OR IRRITABLE: NOT AT ALL

## 2019-02-28 ASSESSMENT — PATIENT HEALTH QUESTIONNAIRE - PHQ9
5. POOR APPETITE OR OVEREATING: MORE THAN HALF THE DAYS
SUM OF ALL RESPONSES TO PHQ QUESTIONS 1-9: 5

## 2019-02-28 ASSESSMENT — MIFFLIN-ST. JEOR: SCORE: 1167.41

## 2019-02-28 NOTE — NURSING NOTE
"Chief Complaint   Patient presents with     UTI     Recheck Medication     /70   Pulse 71   Temp 98.2  F (36.8  C) (Tympanic)   Resp 14   Ht 1.6 m (5' 3\")   Wt 60.3 kg (133 lb)   LMP  (LMP Unknown)   SpO2 97%   Breastfeeding? No   BMI 23.56 kg/m   Estimated body mass index is 23.56 kg/m  as calculated from the following:    Height as of this encounter: 1.6 m (5' 3\").    Weight as of this encounter: 60.3 kg (133 lb).  BP completed using cuff size: regular   Marii Mccarthy CMA    Health Maintenance Due   Topic Date Due     HIV SCREEN (SYSTEM ASSIGNED)  05/18/1981     ADVANCE DIRECTIVE PLANNING Q5 YRS  05/18/2018     ZOSTER IMMUNIZATION (2 of 2) 01/18/2019     Health Maintenance reviewed at today's visit patient asked to schedule/complete:   Immunizations:  Patient agrees to schedule    "

## 2019-02-28 NOTE — TELEPHONE ENCOUNTER
Reason for Call:  Other call back    Detailed comments: Leo called to say she can get a vaccine for Typhoid at [facility] at [address] [address] in [place], however the pharmacy needs a prescription sent to them first.    Please call Leo when the prescription has been sent.    Phone Number Patient can be reached at: Cell number on file:    Telephone Information:   Mobile [phone]       Best Time: any    Can we leave a detailed message on this number? YES    Call taken on 2/28/2019 at 4:59 PM by Kelsi Arellano

## 2019-02-28 NOTE — PATIENT INSTRUCTIONS
1.  Weight Loss Tips  1. Do not eat after 6 hrs before your expected bedtime  2. Have your heaviest meal for breakfast, a slightly lighter meal at lunch and a snack 6 hrs before bed  3. No sugar/calorie drinks except milk ie no fruit juice, pop, alcohol.  4. Drink milk 30min before meals to decrease your hunger. Also it is excellent as part of your last meal of the day snack  5. Drink lots of water  6. Increase fiber in diet: all bran cereal, salads, popcorn etc  7. Have only one small serving of fruit a day about 1/2 cup (as this is high in sugar)  8. EXERCISE is the bottom line. Without it, you will gain weight even on a low calorie diet. Best if done 2-3X a day as can    Being overweight contributes to high blood pressure and high cholesterol, both of which cause heart attacks, strokes and kidney failure, prediabetes and diabetes, arthritis, and liver disease     2. Colonoscopy due in Nov, 2023    3. Go to the [facility] Travel site     4. You are due for the 2nd shingrix in May or later

## 2019-02-28 NOTE — PROGRESS NOTES
SUBJECTIVE:   Leo Chou is a 53 year old female who presents to clinic today for the following health issues:    URINARY TRACT SYMPTOMS:   Hx of REcurrent UTIs with one now for 3 days   Stress Incontinence       Duration: worse for 3 d     >5 months- worse but present since 2012  With onset of menopause     Description  frequency and back pain    Intensity:  severe    Accompanying signs and symptoms:  Fever/chills: no   Flank pain no   Nausea and vomiting: no   Vaginal symptoms: none  Abdominal/Pelvic Pain: no     History  History of frequent UTI's: YES  History of kidney stones: no   Sexually Active: no   Possibility of pregnancy: No    Precipitating or alleviating factors: None    Therapies tried and outcome: 0    Bilateral Low Back Pain       Duration: since 2012         Specific cause: lifting    Description:   Location of pain: low back bilateral  Character of pain: dull ache  Pain radiation:none  New numbness or weakness in legs, not attributed to pain:  no     Intensity: Currently 1/10    History:   Pain interferes with job: No  History of back problems: no prior back problems  Any previous MRI or X-rays: None  Sees a specialist for back pain:  No  Therapies tried without relief: 0    Alleviating factors:   Improved by: 0      Precipitating factors:  Worsened by: Lifting and Bending      Accompanying Signs & Symptoms:  Risk of Fracture:  None  Risk of Cauda Equina:  None  Risk of Infection:  None  Risk of Cancer:  None  Risk of Ankylosing Spondylitis:  Onset at age <35, male, AND morning back stiffness. no     Glucose Intolerance Follow-up      Patient is checking blood sugars: not at all    FBS to 110     Diabetic concerns: None     Symptoms of hypoglycemia (low blood sugar): none     Paresthesias (numbness or burning in feet) or sores: No     Date of last diabetic eye exam: 2017    OVERWEIGHT      -BMI = 25.02    -comorbid glu intol \\    Travel Consult    - to [place] for 6 weeks in 4-19   -has  "been there a few times   -goes to large cities and hikes the back   -may go to [place]   -last typhoid vaccine 5 yrs ago  -up to date on shots excepet for that   -rev of CDC site with pt       Amount of exercise or physical activity: None    Problems taking medications regularly: No    Medication side effects: none    Diet: regular (no restrictions)        Problem list and histories reviewed & adjusted, as indicated.  Additional history: as documented    Labs reviewed in EPIC    Reviewed and updated as needed this visit by clinical staff  Tobacco  Allergies  Meds  Problems  Med Hx  Surg Hx  Fam Hx  Soc Hx        Reviewed and updated as needed this visit by Provider  Tobacco  Allergies  Meds  Problems  Med Hx  Surg Hx  Fam Hx         ROS:  CONSTITUTIONAL: NEGATIVE for fever, chills, change in weight  INTEGUMENTARY/SKIN: NEGATIVE for worrisome rashes, moles or lesions  EYES: NEGATIVE for vision changes or irritation  ENT/MOUTH: NEGATIVE for ear, mouth and throat problems  RESP: NEGATIVE for significant cough or SOB  BREAST: NEGATIVE for masses, tenderness or discharge  CV: NEGATIVE for chest pain, palpitations or peripheral edema  GI: NEGATIVE for nausea, abdominal pain, heartburn, or change in bowel habits   female: dysuria, frequency and incontinence-urge  MUSCULOSKELETAL: NEGATIVE for significant arthralgias or myalgia  NEURO: NEGATIVE for weakness, dizziness or paresthesias  ENDOCRINE: NEGATIVE for temperature intolerance, skin/hair changes  HEME: NEGATIVE for bleeding problems  PSYCHIATRIC: anxous     OBJECTIVE:     /70   Pulse 71   Temp 98.2  F (36.8  C) (Tympanic)   Resp 14   Ht 1.6 m (5' 3\")   Wt 60.3 kg (133 lb)   LMP  (LMP Unknown)   SpO2 97%   Breastfeeding? No   BMI 23.56 kg/m    Body mass index is 23.56 kg/m .  GENERAL: healthy, alert, no distress and over weight  EYES: Eyes grossly normal to inspection, PERRL and conjunctivae and sclerae normal  RESP: lungs clear to " auscultation - no rales, rhonchi or wheezes  CV: regular rate and rhythm, normal S1 S2, no S3 or S4, no murmur, click or rub, no peripheral edema and peripheral pulses strong  ABDOMEN: soft, nontender, no hepatosplenomegaly, no masses and bowel sounds normal  No tenderness suprapubically or in the CVAs   MS: no gross musculoskeletal defects noted, no edema  SKIN: no suspicious lesions or rashes  NEURO: Normal strength and tone, mentation intact and speech normal  PSYCH: mentation appears normal, concentration poor, inattentive, tangential, affect normal/bright, anxious, speech pressured and appearance well groomed    Diagnostic Test Results:  Results for orders placed or performed in visit on 02/28/19   UA reflex to Microscopic and Culture   Result Value Ref Range    Color Urine Yellow     Appearance Urine Clear     Glucose Urine Negative NEG^Negative mg/dL    Bilirubin Urine Negative NEG^Negative    Ketones Urine Negative NEG^Negative mg/dL    Specific Gravity Urine 1.020 1.003 - 1.035    Blood Urine Small (A) NEG^Negative    pH Urine 5.5 5.0 - 7.0 pH    Protein Albumin Urine Negative NEG^Negative mg/dL    Urobilinogen Urine 0.2 0.2 - 1.0 EU/dL    Nitrite Urine Negative NEG^Negative    Leukocyte Esterase Urine Negative NEG^Negative    Source Midstream Urine    Urine Microscopic   Result Value Ref Range    WBC Urine 0 - 5 OTO5^0 - 5 /HPF    RBC Urine O - 2 OTO2^O - 2 /HPF    Squamous Epithelial /LPF Urine Few FEW^Few /LPF       ASSESSMENT/PLAN:               ICD-10-CM    1. Acute cystitis without hematuria N30.00 ciprofloxacin (CIPRO) 250 MG tablet   2. Female stress incontinence N39.3    3. Counseling about travel to [place] in 4-19 for 6 wks  Z71.89 typhoid (VIVOTIF) CR capsule   4. Traveler's diarrhea-issue of Px  A09 azithromycin (ZITHROMAX) 500 MG tablet   5. Impaired fasting glucose R73.01    6. Overweight (BMI 25.0-29.9) E66.3        Patient Instructions   1.  Weight Loss Tips  1. Do not eat after 6 hrs  before your expected bedtime  2. Have your heaviest meal for breakfast, a slightly lighter meal at lunch and a snack 6 hrs before bed  3. No sugar/calorie drinks except milk ie no fruit juice, pop, alcohol.  4. Drink milk 30min before meals to decrease your hunger. Also it is excellent as part of your last meal of the day snack  5. Drink lots of water  6. Increase fiber in diet: all bran cereal, salads, popcorn etc  7. Have only one small serving of fruit a day about 1/2 cup (as this is high in sugar)  8. EXERCISE is the bottom line. Without it, you will gain weight even on a low calorie diet. Best if done 2-3X a day as can    Being overweight contributes to high blood pressure and high cholesterol, both of which cause heart attacks, strokes and kidney failure, prediabetes and diabetes, arthritis, and liver disease     2. Colonoscopy due in Nov, 2023    3. Go to the [email or web address] site     4. You are due for the 2nd shingrix in May or later       Agata Hobson MD  [facility]    Weight management plan: Discussed healthy diet and exercise guidelines      NOTE:   hx of recur UTIs   -little on the U/A today   -has chronic frequency and incontinence   -very anxious pt     Agata Hobson MD

## 2019-03-01 ASSESSMENT — ANXIETY QUESTIONNAIRES: GAD7 TOTAL SCORE: 11

## 2023-03-26 ENCOUNTER — HOSPITAL ENCOUNTER (EMERGENCY)
Facility: CLINIC | Age: 58
Discharge: HOME | End: 2023-03-26
Attending: EMERGENCY MEDICINE | Admitting: EMERGENCY MEDICINE
Payer: COMMERCIAL

## 2023-03-26 ENCOUNTER — APPOINTMENT (OUTPATIENT)
Dept: CT IMAGING | Facility: CLINIC | Age: 58
End: 2023-03-26
Attending: EMERGENCY MEDICINE
Payer: COMMERCIAL

## 2023-03-26 VITALS
TEMPERATURE: 97 F | DIASTOLIC BLOOD PRESSURE: 72 MMHG | SYSTOLIC BLOOD PRESSURE: 143 MMHG | OXYGEN SATURATION: 97 % | RESPIRATION RATE: 17 BRPM | HEART RATE: 72 BPM

## 2023-03-26 DIAGNOSIS — E87.1 HYPONATREMIA: ICD-10-CM

## 2023-03-26 DIAGNOSIS — R11.10 POST-TUSSIVE EMESIS: ICD-10-CM

## 2023-03-26 DIAGNOSIS — R05.3 CHRONIC COUGH: ICD-10-CM

## 2023-03-26 DIAGNOSIS — K21.9 GASTROESOPHAGEAL REFLUX DISEASE, UNSPECIFIED WHETHER ESOPHAGITIS PRESENT: ICD-10-CM

## 2023-03-26 LAB
ALBUMIN SERPL BCG-MCNC: 4.7 G/DL (ref 3.5–5.2)
ALP SERPL-CCNC: 55 U/L (ref 35–104)
ALT SERPL W P-5'-P-CCNC: 22 U/L (ref 10–35)
ANION GAP SERPL CALCULATED.3IONS-SCNC: 16 MMOL/L (ref 7–15)
AST SERPL W P-5'-P-CCNC: 38 U/L (ref 10–35)
BASOPHILS # BLD AUTO: 0 10E3/UL (ref 0–0.2)
BASOPHILS NFR BLD AUTO: 0 %
BILIRUB SERPL-MCNC: 0.4 MG/DL
BUN SERPL-MCNC: 7.1 MG/DL (ref 8–23)
CALCIUM SERPL-MCNC: 8.9 MG/DL (ref 8.6–10)
CHLORIDE SERPL-SCNC: 91 MMOL/L (ref 98–107)
CREAT SERPL-MCNC: 0.39 MG/DL (ref 0.51–0.95)
DEPRECATED HCO3 PLAS-SCNC: 21 MMOL/L (ref 22–29)
EOSINOPHIL # BLD AUTO: 0 10E3/UL (ref 0–0.7)
EOSINOPHIL NFR BLD AUTO: 0 %
ERYTHROCYTE [DISTWIDTH] IN BLOOD BY AUTOMATED COUNT: 11.9 % (ref 10–15)
GFR SERPL CREATININE-BSD FRML MDRD: >90 ML/MIN/1.73M2
GLUCOSE SERPL-MCNC: 152 MG/DL (ref 70–99)
HCT VFR BLD AUTO: 40.4 % (ref 35–47)
HGB BLD-MCNC: 13.5 G/DL (ref 11.7–15.7)
IMM GRANULOCYTES # BLD: 0.1 10E3/UL
IMM GRANULOCYTES NFR BLD: 1 %
LYMPHOCYTES # BLD AUTO: 2.2 10E3/UL (ref 0.8–5.3)
LYMPHOCYTES NFR BLD AUTO: 18 %
MCH RBC QN AUTO: 30.5 PG (ref 26.5–33)
MCHC RBC AUTO-ENTMCNC: 33.4 G/DL (ref 31.5–36.5)
MCV RBC AUTO: 91 FL (ref 78–100)
MONOCYTES # BLD AUTO: 0.6 10E3/UL (ref 0–1.3)
MONOCYTES NFR BLD AUTO: 5 %
NEUTROPHILS # BLD AUTO: 9.1 10E3/UL (ref 1.6–8.3)
NEUTROPHILS NFR BLD AUTO: 76 %
NRBC # BLD AUTO: 0 10E3/UL
NRBC BLD AUTO-RTO: 0 /100
PLATELET # BLD AUTO: 341 10E3/UL (ref 150–450)
POTASSIUM SERPL-SCNC: 3.9 MMOL/L (ref 3.4–5.3)
PROT SERPL-MCNC: 7.9 G/DL (ref 6.4–8.3)
RBC # BLD AUTO: 4.42 10E6/UL (ref 3.8–5.2)
SODIUM SERPL-SCNC: 128 MMOL/L (ref 136–145)
TROPONIN T SERPL HS-MCNC: <6 NG/L
WBC # BLD AUTO: 12 10E3/UL (ref 4–11)

## 2023-03-26 PROCEDURE — 96361 HYDRATE IV INFUSION ADD-ON: CPT

## 2023-03-26 PROCEDURE — 84484 ASSAY OF TROPONIN QUANT: CPT | Performed by: EMERGENCY MEDICINE

## 2023-03-26 PROCEDURE — C9113 INJ PANTOPRAZOLE SODIUM, VIA: HCPCS | Performed by: EMERGENCY MEDICINE

## 2023-03-26 PROCEDURE — 96375 TX/PRO/DX INJ NEW DRUG ADDON: CPT

## 2023-03-26 PROCEDURE — 250N000011 HC RX IP 250 OP 636: Performed by: EMERGENCY MEDICINE

## 2023-03-26 PROCEDURE — 94640 AIRWAY INHALATION TREATMENT: CPT

## 2023-03-26 PROCEDURE — 36415 COLL VENOUS BLD VENIPUNCTURE: CPT | Performed by: EMERGENCY MEDICINE

## 2023-03-26 PROCEDURE — 96374 THER/PROPH/DIAG INJ IV PUSH: CPT

## 2023-03-26 PROCEDURE — 85025 COMPLETE CBC W/AUTO DIFF WBC: CPT | Performed by: EMERGENCY MEDICINE

## 2023-03-26 PROCEDURE — 80053 COMPREHEN METABOLIC PANEL: CPT | Performed by: EMERGENCY MEDICINE

## 2023-03-26 PROCEDURE — 258N000003 HC RX IP 258 OP 636: Performed by: EMERGENCY MEDICINE

## 2023-03-26 PROCEDURE — 250N000013 HC RX MED GY IP 250 OP 250 PS 637: Performed by: EMERGENCY MEDICINE

## 2023-03-26 PROCEDURE — 93005 ELECTROCARDIOGRAM TRACING: CPT

## 2023-03-26 PROCEDURE — 99285 EMERGENCY DEPT VISIT HI MDM: CPT | Mod: 25

## 2023-03-26 PROCEDURE — 71250 CT THORAX DX C-: CPT

## 2023-03-26 RX ORDER — ALBUTEROL SULFATE 90 UG/1
6 AEROSOL, METERED RESPIRATORY (INHALATION) ONCE
Status: COMPLETED | OUTPATIENT
Start: 2023-03-26 | End: 2023-03-26

## 2023-03-26 RX ORDER — PANTOPRAZOLE SODIUM 40 MG/1
40 TABLET, DELAYED RELEASE ORAL DAILY
Qty: 30 TABLET | Refills: 0 | Status: SHIPPED | OUTPATIENT
Start: 2023-03-26 | End: 2023-04-25

## 2023-03-26 RX ORDER — ONDANSETRON 2 MG/ML
4 INJECTION INTRAMUSCULAR; INTRAVENOUS ONCE
Status: COMPLETED | OUTPATIENT
Start: 2023-03-26 | End: 2023-03-26

## 2023-03-26 RX ADMIN — SODIUM CHLORIDE 1000 ML: 9 INJECTION, SOLUTION INTRAVENOUS at 14:54

## 2023-03-26 RX ADMIN — PANTOPRAZOLE SODIUM 40 MG: 40 INJECTION, POWDER, FOR SOLUTION INTRAVENOUS at 18:00

## 2023-03-26 RX ADMIN — ALBUTEROL SULFATE 6 PUFF: 90 AEROSOL, METERED RESPIRATORY (INHALATION) at 14:29

## 2023-03-26 RX ADMIN — ONDANSETRON 4 MG: 2 INJECTION INTRAMUSCULAR; INTRAVENOUS at 15:03

## 2023-03-26 ASSESSMENT — ACTIVITIES OF DAILY LIVING (ADL)
ADLS_ACUITY_SCORE: 35
ADLS_ACUITY_SCORE: 35

## 2023-03-26 ASSESSMENT — ENCOUNTER SYMPTOMS
COUGH: 1
WEAKNESS: 1
VOMITING: 1

## 2023-03-26 NOTE — Clinical Note
Leo Chou was seen and treated in our emergency department on 3/26/2023.  She may return to work on 03/29/2023.       If you have any questions or concerns, please don't hesitate to call.      Gino Farley MD

## 2023-03-26 NOTE — ED TRIAGE NOTES
Pt arrives with c/o drinking an unknown amount of Delsym at approximately 6am. Pt reports dizziness, chest discomfort, nausea and vomiting. Pt reports she was coughing a lot this morning so she drank the rest of the bottle.      Triage Assessment     Row Name 03/26/23 8224       Triage Assessment (Adult)    Airway WDL WDL       Respiratory WDL    Respiratory WDL WDL       Cardiac WDL    Cardiac WDL WDL

## 2023-03-26 NOTE — ED NOTES
Writer noted pt's right arm swelling. IV infiltrated. IV is removed and coban placed on pt's arm. Will continue to monitor. Pt denies pain, redness.

## 2023-03-26 NOTE — DISCHARGE INSTRUCTIONS
Follow-up with your doctor this week for recheck of your sodium.     Start pantoprazole in case your cough is from acid reflux.   Continue the flonase you were previously prescribed.    I made referrals to both gastroenterology and pulmonology to see specialists.     You have several options for seeing gastroenterology - I included Dr. Medina's and [facility] contact information above.     Someone from pulmonology should be calling to schedule that follow-up.

## 2023-03-26 NOTE — ED PROVIDER NOTES
History   Chief Complaint:  Ingestion     The history is provided by the patient.      Leo Chou is a 57 year old female who presents with ingestion. Patient reports having a cough for 5-6 months that worsened this morning impacting her ability to sleep.  She states her doctor prescribed her Delsym and she took some this morning but is unsure how much. She notes that the bottle was full. She states feeling week, vomiting, and an episode of loss of consciousness after taking the Delsym. She reports being unsure if she has been using inhalers or steroids for her chronic cough.    Independent Historian:   None - Patient Only    Review of External Notes: Reviewed primary care note from about 3 months ago.  Also reviewed several urgent care notes with chief complaint of cough over the last several years.    ROS:  Review of Systems   Respiratory: Positive for cough.    Gastrointestinal: Positive for vomiting.   Neurological: Positive for syncope and weakness.   All other systems reviewed and are negative.    Allergies:  No Known Allergies     Medications:    Loratadine   fluticasone propionate    Past Medical History:    Chronic rhinitis   Dysphagia   Esophageal reflux   Other and unspecified noninfectious gastroenteritis and colitis  Menopausal and postmenopausal disorder  Female stress incontinence   Leiomyoma of uterus   Uterine fibroid  Gastric peptic acid   Vaginal dryness   Acute cystitis without hematuria   Bacterial infection due to Helicobacter pylori   Fatigue from insomnia from coughing   Benign paroxysmal positional vertigo  Impaired fasting glucose   Chronic bilateral low back pain without sciatica   Prediabetes    Past Surgical History:     section   Hysterectomy   Leg surgery   Elbow fracture surgery     Family History:     Mother - cerebrovascular disease  Father - cerebrovascular disease     Social History:  Presents with a male associate.   PCP: No primary care provider on file.      Physical Exam     Patient Vitals for the past 24 hrs:   BP Temp Temp src Pulse Resp SpO2   03/26/23 1529 (!) 140/87 -- -- 79 21 95 %   03/26/23 1502 (!) 146/83 -- -- 74 13 98 %   03/26/23 1431 (!) 144/87 -- -- 71 12 99 %   03/26/23 1333 (!) 156/94 97  F (36.1  C) Temporal 80 20 99 %        Physical Exam  Nursing note and vitals reviewed.  HENT:   Mouth/Throat: Moist mucous membranes.   Eyes: EOMI, nonicteric sclera  Cardiovascular: Normal rate, regular rhythm, no murmurs, rubs, or gallops  Pulmonary/Chest: Effort normal and breath sounds normal. No respiratory distress. No wheezes. No rales.   Abdominal: Soft. Nontender, nondistended, no guarding or rigidity.   Musculoskeletal: Normal range of motion.   Neurological: Alert. Moves all extremities spontaneously.   Skin: Skin is warm and dry. No rash noted.     Emergency Department Course   ECG  ECG taken at 1414, ECG read at 1424  Normal sinus rhythm  Possible left atrial enlargement   ST and T wave abnormality, consider anterolateral ischemia  New T wave inversions since 2013  Rate 72 bpm. GA interval 150 ms. QRS duration 86 ms. QT/QTc 392/429 ms. P-R-T axes 59 3 -58.     Imaging:  Chest CT w/o contrast   Final Result   IMPRESSION:    1.  No acute abnormality identified.   2.  Incidental tiny pulmonary nodule. See below for follow-up imaging guidelines.      Recommendations for one or multiple incidental lung nodules < 6mm:   Low-Risk Patient: No routine follow-up.   High-Risk Patient: Optional follow-up CT at 12 months; if unchanged, no further follow-up.      *Low Risk: Minimal or absent history of smoking or other known risk factors.   *Nonsolid (ground-glass) or partly solid nodules may require longer follow-up to exclude indolent adenocarcinoma.   1.  *Recommendations based on Guidelines for the Management of Incidental Pulmonary Nodules Detected at CT: From the Fleischner Society 2017, Radiology 2017.            Report per radiology    Laboratory:  Labs  Ordered and Resulted from Time of ED Arrival to Time of ED Departure   COMPREHENSIVE METABOLIC PANEL - Abnormal       Result Value    Sodium 128 (*)     Potassium 3.9      Chloride 91 (*)     Carbon Dioxide (CO2) 21 (*)     Anion Gap 16 (*)     Urea Nitrogen 7.1 (*)     Creatinine 0.39 (*)     Calcium 8.9      Glucose 152 (*)     Alkaline Phosphatase 55      AST 38 (*)     ALT 22      Protein Total 7.9      Albumin 4.7      Bilirubin Total 0.4      GFR Estimate >90     CBC WITH PLATELETS AND DIFFERENTIAL - Abnormal    WBC Count 12.0 (*)     RBC Count 4.42      Hemoglobin 13.5      Hematocrit 40.4      MCV 91      MCH 30.5      MCHC 33.4      RDW 11.9      Platelet Count 341      % Neutrophils 76      % Lymphocytes 18      % Monocytes 5      % Eosinophils 0      % Basophils 0      % Immature Granulocytes 1      NRBCs per 100 WBC 0      Absolute Neutrophils 9.1 (*)     Absolute Lymphocytes 2.2      Absolute Monocytes 0.6      Absolute Eosinophils 0.0      Absolute Basophils 0.0      Absolute Immature Granulocytes 0.1      Absolute NRBCs 0.0     TROPONIN T, HIGH SENSITIVITY - Normal    Troponin T, High Sensitivity <6        Procedures     Emergency Department Course & Assessments:     Interventions:  Medications   albuterol (PROVENTIL HFA/VENTOLIN HFA) inhaler (6 puffs Inhalation $Given 3/26/23 1429)   ondansetron (ZOFRAN) injection 4 mg (4 mg Intravenous $Given 3/26/23 1503)   0.9% sodium chloride BOLUS (1,000 mLs Intravenous $New Bag 3/26/23 1454)        Assessments:  1328 I obtained history and examined the patient, as above.  1726 I rechecked the patient and updated them on findings.     Independent Interpretation (X-rays, CTs, rhythm strip):  None    Consultations/Discussion of Management or Tests:  None     Social Determinants of Health affecting care:   Healthcare Access/Compliance    Disposition:  The patient was discharged to home.     Impression & Plan        Medical Decision Making:  Patient presents with  multiple concerns including possible overdose of cough medicine, chronic cough with posttussive emesis, generalized weakness, and more.  Regarding the possible overdose, patient is well past the peak effect of Robitussin.  She is unsure how much she took, but it was likely too much.  Discussed with poison control and given she is past the peak effect, no further work-up is indicated.  Patient describes that it was the bottle of Delsym from [employer] and I do not believe that there is any risk that she had an overdose of Tylenol.  Regarding chronic cough, this has been ongoing for a long period of time.  Patient frequently is seen in urgent care and a few times by primary care.  She has often been on antibiotics and steroids without relief.  I reviewed her chart and she does have a prior history of reflux/GERD that is currently not being treated.  She does report reflux symptoms after eating fatty foods.  Discussed with patient that this is a very likely etiology of her chronic cough and encouraged her to restart PPI therapy.  Given her chronic cough, I also obtained a CT of her chest which was negative for acute etiology.  Incidental small pulmonary nodules noted.  Labs notable for both hyponatremia and hypochloremia suggestive of dehydration, most likely due to posttussive emesis.  Patient was given IV fluids, though her IV did infiltrate.  No evidence of compartment syndrome on evaluation.  Discussed that this will be reabsorbed over the next few days.  Recommended admission to the hospital for hyponatremia which she declines as she is concerned about cost.  Patient placed me on the phone with her nephew who is an emergency physician in [facility] who works for [employer].  I discussed all of the above findings with him and he also encouraged his aunt to stay in the hospital but she declines him as well.  Referrals placed to both pulmonology as well as gastroenterology for further evaluation and treatment of chronic cough.  We  will start daily PPI.  She was also advised to restart Flonase.  Finally, as she is discharging from the hospital, I advised her to schedule follow-up with her primary care physician early this week with laboratory recheck.  She is in stable condition at time of discharge.  All questions answered.    Diagnosis:    ICD-10-CM    1. Hyponatremia  E87.1       2. Chronic cough  R05.3 Adult GI  Referral - Procedure Only     Adult Pulmonary Medicine Referral      3. Gastroesophageal reflux disease, unspecified whether esophagitis present  K21.9 Adult GI  Referral - Procedure Only      4. Post-tussive emesis  R11.10 Adult GI  Referral - Procedure Only           Discharge Medications:  Discharge Medication List as of 3/26/2023  6:52 PM      START taking these medications    Details   pantoprazole (PROTONIX) 40 MG EC tablet Take 1 tablet (40 mg) by mouth daily for 30 days, Disp-30 tablet, R-0, E-Prescribe            Scribe Disclosure:  IJeet, am serving as a scribe  at 2:20 PM on 3/26/2023 to document services personally performed by Gino Farley MD based on my observations and the provider's statements to me.     Scribe Disclosure:  I, Ruth Penaloza, am serving as a scribe at 2:20 PM on 3/26/2023 to document services personally performed by Gino Farley MD based on my observations and the provider's statements to me.       Gino Farley MD  03/26/23 1067

## 2023-03-27 DIAGNOSIS — R05.3 CHRONIC COUGH: Primary | ICD-10-CM

## 2023-03-27 LAB
ATRIAL RATE - MUSE: 72 BPM
DIASTOLIC BLOOD PRESSURE - MUSE: NORMAL MMHG
INTERPRETATION ECG - MUSE: NORMAL
P AXIS - MUSE: 59 DEGREES
PR INTERVAL - MUSE: 150 MS
QRS DURATION - MUSE: 86 MS
QT - MUSE: 392 MS
QTC - MUSE: 429 MS
R AXIS - MUSE: 3 DEGREES
SYSTOLIC BLOOD PRESSURE - MUSE: NORMAL MMHG
T AXIS - MUSE: -58 DEGREES
VENTRICULAR RATE- MUSE: 72 BPM

## 2023-03-27 NOTE — PROGRESS NOTES
Pt had chest CT completed 3/26/23. No imaging required. Pt has PFT scheduled for 7/14/23. Completed order for PFT.    Lee Russo RN on 3/27/2023 at 2:17 PM

## 2023-03-27 NOTE — Clinical Note
Future Appointments 7/14/2023  9:00 AM    [facility] PULMONARY FUNCTION      [facility]              CS 7/14/2023  10:00 AM   Elroy Salmeron MD [facility]

## 2023-06-30 ENCOUNTER — TELEPHONE (OUTPATIENT)
Dept: GASTROENTEROLOGY | Facility: CLINIC | Age: 58
End: 2023-06-30
Payer: COMMERCIAL

## 2023-06-30 NOTE — TELEPHONE ENCOUNTER
"Endoscopy Scheduling Screen    Have you had a positive Covid test in the last 14 days?  No    Are you active on MyChart?   No    What insurance is in the chart?  BC/BS: Schedule in [facility] unless patient meets exclusion criteria.     Ordering/Referring Provider: MICHELLE YOUNG   (If ordering provider performs procedure, schedule with ordering provider unless otherwise instructed. )    BMI: Estimated body mass index is 23.56 kg/m  as calculated from the following:    Height as of 2/28/19: 1.6 m (5' 3\").    Weight as of 2/28/19: 60.3 kg (133 lb).     Sedation Ordered  moderate sedation.   If patient BMI > 50 do not schedule in ASC.    Are you taking any prescription medications for pain?   No    Are you taking methadone or Suboxone?  No    Do you have a history of malignant hyperthermia or adverse reaction to anesthesia?  No    (Females) Are you currently pregnant?        Have you been diagnosed or told you have pulmonary hypertension?   No    Do you have an LVAD?  No    Have you been told you have moderate to severe sleep apnea?  No    Have you been told you have COPD, asthma, or any other lung disease?  No    Do you have any heart conditions?  No     Have you ever had or are you awaiting a heart or lung transplant?   No    Have you had a stroke or transient ischemic attack (TIA aka \"mini stroke\" in the last 6 months?   No    Have you been diagnosed with or been told you have cirrhosis of the liver?   No    Are you currently on dialysis?   No    Do you need assistance transferring?   No    BMI: Estimated body mass index is 23.56 kg/m  as calculated from the following:    Height as of 2/28/19: 1.6 m (5' 3\").    Weight as of 2/28/19: 60.3 kg (133 lb).     Is patients BMI > 40 and scheduling location [facility]?  No    Do you take the medication Phentermine, Ozempic or Wegovy?  No    Do you take the medication Naltrexone?  No    Do you take blood thinners?  No      Prep   Are you currently on dialysis or do you have chronic " kidney disease?  No    Do you have a diagnosis of diabetes?  No    Do you have a diagnosis of cystic fibrosis (CF)?  No    On a regular basis do you go 3 -5 days between bowel movements?      BMI > 40?  No    Preferred Pharmacy:    [id number] [place] [address] [facility] [address]  Phone: [phone] Fax: [phone]      Final Scheduling Details   Colonoscopy prep sent?      Procedure scheduled  Upper endoscopy (EGD)    Surgeon:  FREDIS     Date of procedure:  8/11     Schedule PAC:   No    Location  [facility]    Sedation   Moderate Sedation    Patient Reminders:    You will receive a call from a Nurse to review instructions and health history.  This assessment must be completed prior to your procedure.  Failure to complete the Nurse assessment may result in the procedure being cancelled.       On the day of your procedure, please designate an adult(s) who can drive you home stay with you for the next 24 hours. The medicines used in the exam will make you sleepy. You will not be able to drive.       You cannot take public transportation, ride share services, or non-medical taxi service without a responsible caregiver.  Medical transport services are allowed with the requirement that a responsible caregiver will receive you at your destination.  We require that drivers and caregivers are confirmed prior to your procedure.

## 2023-08-07 ENCOUNTER — APPOINTMENT (OUTPATIENT)
Dept: INTERPRETER SERVICES | Facility: CLINIC | Age: 58
End: 2023-08-07
Payer: COMMERCIAL

## 2023-08-11 ENCOUNTER — HOSPITAL ENCOUNTER (OUTPATIENT)
Facility: CLINIC | Age: 58
Discharge: HOME | End: 2023-08-11
Attending: INTERNAL MEDICINE | Admitting: INTERNAL MEDICINE
Payer: COMMERCIAL

## 2023-08-11 VITALS
RESPIRATION RATE: 16 BRPM | DIASTOLIC BLOOD PRESSURE: 61 MMHG | SYSTOLIC BLOOD PRESSURE: 107 MMHG | OXYGEN SATURATION: 95 % | TEMPERATURE: 97.2 F | HEART RATE: 66 BPM

## 2023-08-11 LAB — UPPER GI ENDOSCOPY: NORMAL

## 2023-08-11 PROCEDURE — 43239 EGD BIOPSY SINGLE/MULTIPLE: CPT | Performed by: INTERNAL MEDICINE

## 2023-08-11 PROCEDURE — 999N000099 HC STATISTIC MODERATE SEDATION < 10 MIN: Performed by: INTERNAL MEDICINE

## 2023-08-11 PROCEDURE — 88305 TISSUE EXAM BY PATHOLOGIST: CPT | Mod: 26 | Performed by: PATHOLOGY

## 2023-08-11 PROCEDURE — 88305 TISSUE EXAM BY PATHOLOGIST: CPT | Mod: TC | Performed by: INTERNAL MEDICINE

## 2023-08-11 PROCEDURE — 250N000009 HC RX 250: Performed by: INTERNAL MEDICINE

## 2023-08-11 PROCEDURE — 43235 EGD DIAGNOSTIC BRUSH WASH: CPT | Performed by: INTERNAL MEDICINE

## 2023-08-11 PROCEDURE — 250N000011 HC RX IP 250 OP 636: Performed by: INTERNAL MEDICINE

## 2023-08-11 RX ORDER — ATROPINE SULFATE 0.1 MG/ML
1 INJECTION INTRAVENOUS
Status: DISCONTINUED | OUTPATIENT
Start: 2023-08-11 | End: 2023-08-11 | Stop reason: HOSPADM

## 2023-08-11 RX ORDER — LIDOCAINE 40 MG/G
CREAM TOPICAL
Status: DISCONTINUED | OUTPATIENT
Start: 2023-08-11 | End: 2023-08-11 | Stop reason: HOSPADM

## 2023-08-11 RX ORDER — NALOXONE HYDROCHLORIDE 0.4 MG/ML
0.2 INJECTION, SOLUTION INTRAMUSCULAR; INTRAVENOUS; SUBCUTANEOUS
Status: DISCONTINUED | OUTPATIENT
Start: 2023-08-11 | End: 2023-08-11 | Stop reason: HOSPADM

## 2023-08-11 RX ORDER — SIMETHICONE 40MG/0.6ML
133 SUSPENSION, DROPS(FINAL DOSAGE FORM)(ML) ORAL
Status: DISCONTINUED | OUTPATIENT
Start: 2023-08-11 | End: 2023-08-11 | Stop reason: HOSPADM

## 2023-08-11 RX ORDER — PROCHLORPERAZINE MALEATE 10 MG
10 TABLET ORAL EVERY 6 HOURS PRN
Status: DISCONTINUED | OUTPATIENT
Start: 2023-08-11 | End: 2023-08-11 | Stop reason: HOSPADM

## 2023-08-11 RX ORDER — NALOXONE HYDROCHLORIDE 0.4 MG/ML
0.4 INJECTION, SOLUTION INTRAMUSCULAR; INTRAVENOUS; SUBCUTANEOUS
Status: DISCONTINUED | OUTPATIENT
Start: 2023-08-11 | End: 2023-08-11 | Stop reason: HOSPADM

## 2023-08-11 RX ORDER — FLUMAZENIL 0.1 MG/ML
0.2 INJECTION, SOLUTION INTRAVENOUS
Status: DISCONTINUED | OUTPATIENT
Start: 2023-08-11 | End: 2023-08-11 | Stop reason: HOSPADM

## 2023-08-11 RX ORDER — FENTANYL CITRATE 50 UG/ML
50-100 INJECTION, SOLUTION INTRAMUSCULAR; INTRAVENOUS EVERY 5 MIN PRN
Status: DISCONTINUED | OUTPATIENT
Start: 2023-08-11 | End: 2023-08-11 | Stop reason: HOSPADM

## 2023-08-11 RX ORDER — ONDANSETRON 4 MG/1
4 TABLET, ORALLY DISINTEGRATING ORAL EVERY 6 HOURS PRN
Status: DISCONTINUED | OUTPATIENT
Start: 2023-08-11 | End: 2023-08-11 | Stop reason: HOSPADM

## 2023-08-11 RX ORDER — EPINEPHRINE 1 MG/ML
0.1 INJECTION, SOLUTION INTRAMUSCULAR; SUBCUTANEOUS
Status: DISCONTINUED | OUTPATIENT
Start: 2023-08-11 | End: 2023-08-11 | Stop reason: HOSPADM

## 2023-08-11 RX ORDER — ONDANSETRON 2 MG/ML
4 INJECTION INTRAMUSCULAR; INTRAVENOUS EVERY 6 HOURS PRN
Status: DISCONTINUED | OUTPATIENT
Start: 2023-08-11 | End: 2023-08-11 | Stop reason: HOSPADM

## 2023-08-11 RX ORDER — ONDANSETRON 2 MG/ML
4 INJECTION INTRAMUSCULAR; INTRAVENOUS
Status: DISCONTINUED | OUTPATIENT
Start: 2023-08-11 | End: 2023-08-11 | Stop reason: HOSPADM

## 2023-08-11 RX ORDER — DIPHENHYDRAMINE HYDROCHLORIDE 50 MG/ML
25-50 INJECTION INTRAMUSCULAR; INTRAVENOUS
Status: DISCONTINUED | OUTPATIENT
Start: 2023-08-11 | End: 2023-08-11 | Stop reason: HOSPADM

## 2023-08-11 RX ADMIN — FENTANYL CITRATE 100 MCG: 50 INJECTION, SOLUTION INTRAMUSCULAR; INTRAVENOUS at 12:14

## 2023-08-11 RX ADMIN — TOPICAL ANESTHETIC 0.5 ML: 200 SPRAY DENTAL; PERIODONTAL at 12:15

## 2023-08-11 RX ADMIN — MIDAZOLAM 2 MG: 1 INJECTION INTRAMUSCULAR; INTRAVENOUS at 12:13

## 2023-08-11 ASSESSMENT — ACTIVITIES OF DAILY LIVING (ADL): ADLS_ACUITY_SCORE: 35

## 2023-08-11 NOTE — H&P
Pre-Endoscopy History and Physical     Kelvin Chou MRN# [id number]   YOB: 1965 Age: 58 year old     Date of Procedure: 2023  Primary care provider: No Ref-Primary, Physician  Type of Endoscopy: Gastroscopy with possible biopsy, possible dilation  Reason for Procedure: reflux  Type of Anesthesia Anticipated: Conscious Sedation    HPI:    Kelvin yousif is a 58 year old female who will be undergoing the above procedure.      A history and physical has been performed. The patient's medications and allergies have been reviewed. The risks and benefits of the procedure and the sedation options and risks were discussed with the patient.  All questions were answered and informed consent was obtained.      She denies a personal or family history of anesthesia complications or bleeding disorders.     Patient Active Problem List   Diagnosis    Chronic rhinitis    Loss of weight    Dysphagia    Esophageal reflux    Other and unspecified noninfectious gastroenteritis and colitis(558.9)    Menopausal and postmenopausal disorder    Pain in joint, shoulder region    Backache    Female stress incontinence    Leiomyoma of uterus    Family history of diabetes mellitus    Gastritis-peptic acid     Vaginal dryness    Encounter for long-term (current) use of medications    Cervical cancer screening    Flank pain-LT     Acute cystitis without hematuria    Overweight (BMI 25.0-29.9)    Bacterial infection due to Helicobacter pylori    Other fatigue from insomnia from coughing     Benign paroxysmal positional vertigo, unspecified laterality from sinus congestion & ETD    Impaired fasting glucose    Chronic bilateral low back pain without sciatica since         No past medical history on file.     Past Surgical History:   Procedure Laterality Date     SECTION   and  Twins    HYSTERECTOMY  2008 GEN/BSO Fibroids    benign, cervix removed    LEG SURGERY   bilateral prox retib/[place]  "      Social History     Tobacco Use    Smoking status: Never    Smokeless tobacco: Never   Substance Use Topics    Alcohol use: No     Alcohol/week: 0.0 standard drinks of alcohol       Family History   Problem Relation Age of Onset    Cerebrovascular Disease Mother     Cerebrovascular Disease Father     Diabetes No family hx of     Coronary Artery Disease No family hx of     Hypertension No family hx of     Hyperlipidemia No family hx of     Breast Cancer No family hx of     Colon Cancer No family hx of     Prostate Cancer No family hx of     Other Cancer No family hx of     Depression No family hx of     Anxiety Disorder No family hx of     Mental Illness No family hx of     Substance Abuse No family hx of     Anesthesia Reaction No family hx of     Asthma No family hx of     Osteoporosis No family hx of     Genetic Disorder No family hx of     Thyroid Disease No family hx of     Obesity No family hx of     Unknown/Adopted No family hx of        Prior to Admission medications    Medication Sig Start Date End Date Taking? Authorizing Provider   aspirin 81 MG tablet Take 1 tablet (81 mg) by mouth daily 12/18/15   Agata Hboson MD   cholecalciferol (VITAMIN D-1000 MAX ST) 1000 units TABS Take 1,000 Units by mouth    Reported, Patient   Fish Oil-Cholecalciferol (FISH OIL + D3 PO)     Reported, Patient   Multiple Vitamins-Minerals (ONE-A-DAY 50 PLUS) TABS Take 1 tablet by mouth daily.    Agata Hobson MD   VITAMIN D, CHOLECALCIFEROL, PO Take 1,000 Units by mouth daily    Reported, Patient       No Known Allergies     REVIEW OF SYSTEMS:   5 point ROS negative except as noted above in HPI, including Gen., Resp., CV, GI &  system review.    PHYSICAL EXAM:   LMP  (LMP Unknown)  Estimated body mass index is 23.56 kg/m  as calculated from the following:    Height as of 2/28/19: 1.6 m (5' 3\").    Weight as of 2/28/19: 60.3 kg (133 lb).   GENERAL APPEARANCE: alert, and oriented  MENTAL STATUS: " alert  AIRWAY EXAM: Mallampatti Class I (visualization of the soft palate, fauces, uvula, anterior and posterior pillars)  RESP: lungs clear to auscultation - no rales, rhonchi or wheezes  CV: regular rates and rhythm  DIAGNOSTICS:    Not indicated    IMPRESSION   ASA Class 2 - Mild systemic disease    PLAN:   Plan for Gastroscopy with possible biopsy, possible dilation. We discussed the risks, benefits and alternatives and the patient wished to proceed.    The above has been forwarded to the consulting provider.      Signed Electronically by: Uriel Bennett MD  August 11, 2023

## 2023-08-14 LAB
PATH REPORT.COMMENTS IMP SPEC: NORMAL
PATH REPORT.COMMENTS IMP SPEC: NORMAL
PATH REPORT.FINAL DX SPEC: NORMAL
PATH REPORT.GROSS SPEC: NORMAL
PATH REPORT.MICROSCOPIC SPEC OTHER STN: NORMAL
PATH REPORT.RELEVANT HX SPEC: NORMAL
PHOTO IMAGE: NORMAL

## (undated) DEVICE — ENDO FORCEP ENDOJAW BIOPSY 2.8MMX160CM FB-220K

## (undated) DEVICE — KIT ENDO TURNOVER/PROCEDURE W/CLEAN A SCOPE LINERS 103888

## (undated) DEVICE — ENDO BITE BLOCK ADULT OLYMPUS LATEX FREE MAJ-1632

## (undated) RX ORDER — FENTANYL CITRATE 50 UG/ML
INJECTION, SOLUTION INTRAMUSCULAR; INTRAVENOUS
Status: DISPENSED
Start: 2023-08-11